# Patient Record
Sex: FEMALE | Race: ASIAN | NOT HISPANIC OR LATINO
[De-identification: names, ages, dates, MRNs, and addresses within clinical notes are randomized per-mention and may not be internally consistent; named-entity substitution may affect disease eponyms.]

---

## 2017-01-26 ENCOUNTER — APPOINTMENT (OUTPATIENT)
Dept: CARDIOLOGY | Facility: CLINIC | Age: 72
End: 2017-01-26

## 2017-01-26 ENCOUNTER — NON-APPOINTMENT (OUTPATIENT)
Age: 72
End: 2017-01-26

## 2017-01-26 VITALS
HEART RATE: 79 BPM | BODY MASS INDEX: 26.74 KG/M2 | RESPIRATION RATE: 17 BRPM | WEIGHT: 149 LBS | TEMPERATURE: 97.6 F | DIASTOLIC BLOOD PRESSURE: 76 MMHG | OXYGEN SATURATION: 96 % | HEIGHT: 62.6 IN | SYSTOLIC BLOOD PRESSURE: 113 MMHG

## 2017-01-26 DIAGNOSIS — R06.00 DYSPNEA, UNSPECIFIED: ICD-10-CM

## 2017-01-26 DIAGNOSIS — R42 DIZZINESS AND GIDDINESS: ICD-10-CM

## 2017-01-26 DIAGNOSIS — R60.0 LOCALIZED EDEMA: ICD-10-CM

## 2017-01-26 DIAGNOSIS — R00.2 PALPITATIONS: ICD-10-CM

## 2017-01-26 PROBLEM — Z00.00 ENCOUNTER FOR PREVENTIVE HEALTH EXAMINATION: Status: ACTIVE | Noted: 2017-01-26

## 2017-01-27 PROBLEM — R60.0 LEG EDEMA: Status: ACTIVE | Noted: 2017-01-27

## 2017-01-27 PROBLEM — R06.00 DYSPNEA: Status: ACTIVE | Noted: 2017-01-27

## 2017-01-27 PROBLEM — R00.2 PALPITATIONS: Status: ACTIVE | Noted: 2017-01-27

## 2017-02-01 ENCOUNTER — APPOINTMENT (OUTPATIENT)
Dept: CARDIOLOGY | Facility: CLINIC | Age: 72
End: 2017-02-01

## 2017-07-03 ENCOUNTER — EMERGENCY (EMERGENCY)
Facility: HOSPITAL | Age: 72
LOS: 1 days | Discharge: HOME | End: 2017-07-03
Attending: EMERGENCY MEDICINE | Admitting: EMERGENCY MEDICINE
Payer: MEDICARE

## 2017-07-03 VITALS
SYSTOLIC BLOOD PRESSURE: 141 MMHG | OXYGEN SATURATION: 95 % | HEART RATE: 90 BPM | TEMPERATURE: 100 F | RESPIRATION RATE: 18 BRPM | DIASTOLIC BLOOD PRESSURE: 81 MMHG

## 2017-07-03 DIAGNOSIS — Z90.710 ACQUIRED ABSENCE OF BOTH CERVIX AND UTERUS: Chronic | ICD-10-CM

## 2017-07-03 DIAGNOSIS — Z96.659 PRESENCE OF UNSPECIFIED ARTIFICIAL KNEE JOINT: Chronic | ICD-10-CM

## 2017-07-03 LAB
ALBUMIN SERPL ELPH-MCNC: 4 G/DL — SIGNIFICANT CHANGE UP (ref 3.3–5)
ALP SERPL-CCNC: 61 U/L — SIGNIFICANT CHANGE UP (ref 40–120)
ALT FLD-CCNC: 9 U/L RC — LOW (ref 10–45)
ANION GAP SERPL CALC-SCNC: 14 MMOL/L — SIGNIFICANT CHANGE UP (ref 5–17)
APPEARANCE UR: CLEAR — SIGNIFICANT CHANGE UP
AST SERPL-CCNC: 19 U/L — SIGNIFICANT CHANGE UP (ref 10–40)
BASOPHILS # BLD AUTO: 0 K/UL — SIGNIFICANT CHANGE UP (ref 0–0.2)
BASOPHILS NFR BLD AUTO: 0.1 % — SIGNIFICANT CHANGE UP (ref 0–2)
BILIRUB SERPL-MCNC: 0.8 MG/DL — SIGNIFICANT CHANGE UP (ref 0.2–1.2)
BILIRUB UR-MCNC: NEGATIVE — SIGNIFICANT CHANGE UP
BUN SERPL-MCNC: 23 MG/DL — SIGNIFICANT CHANGE UP (ref 7–23)
CALCIUM SERPL-MCNC: 9.5 MG/DL — SIGNIFICANT CHANGE UP (ref 8.4–10.5)
CHLORIDE SERPL-SCNC: 99 MMOL/L — SIGNIFICANT CHANGE UP (ref 96–108)
CO2 SERPL-SCNC: 26 MMOL/L — SIGNIFICANT CHANGE UP (ref 22–31)
COLOR SPEC: YELLOW — SIGNIFICANT CHANGE UP
CREAT SERPL-MCNC: 1.18 MG/DL — SIGNIFICANT CHANGE UP (ref 0.5–1.3)
DIFF PNL FLD: ABNORMAL
EOSINOPHIL # BLD AUTO: 0.5 K/UL — SIGNIFICANT CHANGE UP (ref 0–0.5)
EOSINOPHIL NFR BLD AUTO: 4.6 % — SIGNIFICANT CHANGE UP (ref 0–6)
EPI CELLS # UR: SIGNIFICANT CHANGE UP /HPF
GAS PNL BLDV: SIGNIFICANT CHANGE UP
GLUCOSE SERPL-MCNC: 113 MG/DL — HIGH (ref 70–99)
GLUCOSE UR QL: NEGATIVE — SIGNIFICANT CHANGE UP
HCT VFR BLD CALC: 37.9 % — SIGNIFICANT CHANGE UP (ref 34.5–45)
HGB BLD-MCNC: 12.9 G/DL — SIGNIFICANT CHANGE UP (ref 11.5–15.5)
KETONES UR-MCNC: NEGATIVE — SIGNIFICANT CHANGE UP
LEUKOCYTE ESTERASE UR-ACNC: ABNORMAL
LYMPHOCYTES # BLD AUTO: 0.6 K/UL — LOW (ref 1–3.3)
LYMPHOCYTES # BLD AUTO: 5.2 % — LOW (ref 13–44)
MCHC RBC-ENTMCNC: 31.2 PG — SIGNIFICANT CHANGE UP (ref 27–34)
MCHC RBC-ENTMCNC: 34.1 GM/DL — SIGNIFICANT CHANGE UP (ref 32–36)
MCV RBC AUTO: 91.5 FL — SIGNIFICANT CHANGE UP (ref 80–100)
MONOCYTES # BLD AUTO: 0.4 K/UL — SIGNIFICANT CHANGE UP (ref 0–0.9)
MONOCYTES NFR BLD AUTO: 3.6 % — SIGNIFICANT CHANGE UP (ref 2–14)
NEUTROPHILS # BLD AUTO: 10.2 K/UL — HIGH (ref 1.8–7.4)
NEUTROPHILS NFR BLD AUTO: 86.6 % — HIGH (ref 43–77)
NITRITE UR-MCNC: NEGATIVE — SIGNIFICANT CHANGE UP
PH UR: 6.5 — SIGNIFICANT CHANGE UP (ref 5–8)
PLATELET # BLD AUTO: 160 K/UL — SIGNIFICANT CHANGE UP (ref 150–400)
POTASSIUM SERPL-MCNC: 4.4 MMOL/L — SIGNIFICANT CHANGE UP (ref 3.5–5.3)
POTASSIUM SERPL-SCNC: 4.4 MMOL/L — SIGNIFICANT CHANGE UP (ref 3.5–5.3)
PROT SERPL-MCNC: 7 G/DL — SIGNIFICANT CHANGE UP (ref 6–8.3)
PROT UR-MCNC: 300 MG/DL
RBC # BLD: 4.14 M/UL — SIGNIFICANT CHANGE UP (ref 3.8–5.2)
RBC # FLD: 11.6 % — SIGNIFICANT CHANGE UP (ref 10.3–14.5)
RBC CASTS # UR COMP ASSIST: ABNORMAL /HPF (ref 0–2)
SODIUM SERPL-SCNC: 139 MMOL/L — SIGNIFICANT CHANGE UP (ref 135–145)
SP GR SPEC: 1.01 — SIGNIFICANT CHANGE UP (ref 1.01–1.02)
UROBILINOGEN FLD QL: NEGATIVE — SIGNIFICANT CHANGE UP
WBC # BLD: 11.8 K/UL — HIGH (ref 3.8–10.5)
WBC # FLD AUTO: 11.8 K/UL — HIGH (ref 3.8–10.5)
WBC UR QL: SIGNIFICANT CHANGE UP /HPF (ref 0–5)

## 2017-07-03 PROCEDURE — 83605 ASSAY OF LACTIC ACID: CPT

## 2017-07-03 PROCEDURE — 85027 COMPLETE CBC AUTOMATED: CPT

## 2017-07-03 PROCEDURE — 84295 ASSAY OF SERUM SODIUM: CPT

## 2017-07-03 PROCEDURE — 82947 ASSAY GLUCOSE BLOOD QUANT: CPT

## 2017-07-03 PROCEDURE — 71020: CPT | Mod: 26

## 2017-07-03 PROCEDURE — 81001 URINALYSIS AUTO W/SCOPE: CPT

## 2017-07-03 PROCEDURE — 82435 ASSAY OF BLOOD CHLORIDE: CPT

## 2017-07-03 PROCEDURE — 82803 BLOOD GASES ANY COMBINATION: CPT

## 2017-07-03 PROCEDURE — 96376 TX/PRO/DX INJ SAME DRUG ADON: CPT

## 2017-07-03 PROCEDURE — 85014 HEMATOCRIT: CPT

## 2017-07-03 PROCEDURE — 87086 URINE CULTURE/COLONY COUNT: CPT

## 2017-07-03 PROCEDURE — 71046 X-RAY EXAM CHEST 2 VIEWS: CPT

## 2017-07-03 PROCEDURE — 96375 TX/PRO/DX INJ NEW DRUG ADDON: CPT

## 2017-07-03 PROCEDURE — 99220: CPT

## 2017-07-03 PROCEDURE — 99284 EMERGENCY DEPT VISIT MOD MDM: CPT | Mod: 25

## 2017-07-03 PROCEDURE — 96374 THER/PROPH/DIAG INJ IV PUSH: CPT

## 2017-07-03 PROCEDURE — 82330 ASSAY OF CALCIUM: CPT

## 2017-07-03 PROCEDURE — 80053 COMPREHEN METABOLIC PANEL: CPT

## 2017-07-03 PROCEDURE — 84132 ASSAY OF SERUM POTASSIUM: CPT

## 2017-07-03 PROCEDURE — G0378: CPT

## 2017-07-03 PROCEDURE — 87040 BLOOD CULTURE FOR BACTERIA: CPT

## 2017-07-03 RX ORDER — KETOROLAC TROMETHAMINE 30 MG/ML
30 SYRINGE (ML) INJECTION ONCE
Qty: 0 | Refills: 0 | Status: DISCONTINUED | OUTPATIENT
Start: 2017-07-03 | End: 2017-07-03

## 2017-07-03 RX ORDER — SODIUM CHLORIDE 9 MG/ML
1000 INJECTION INTRAMUSCULAR; INTRAVENOUS; SUBCUTANEOUS
Qty: 0 | Refills: 0 | Status: DISCONTINUED | OUTPATIENT
Start: 2017-07-03 | End: 2017-07-07

## 2017-07-03 RX ORDER — KETOROLAC TROMETHAMINE 30 MG/ML
15 SYRINGE (ML) INJECTION ONCE
Qty: 0 | Refills: 0 | Status: DISCONTINUED | OUTPATIENT
Start: 2017-07-03 | End: 2017-07-03

## 2017-07-03 RX ORDER — ACETAMINOPHEN 500 MG
1000 TABLET ORAL ONCE
Qty: 0 | Refills: 0 | Status: COMPLETED | OUTPATIENT
Start: 2017-07-03 | End: 2017-07-03

## 2017-07-03 RX ORDER — SODIUM CHLORIDE 9 MG/ML
1000 INJECTION INTRAMUSCULAR; INTRAVENOUS; SUBCUTANEOUS ONCE
Qty: 0 | Refills: 0 | Status: COMPLETED | OUTPATIENT
Start: 2017-07-03 | End: 2017-07-03

## 2017-07-03 RX ORDER — CEFTRIAXONE 500 MG/1
1 INJECTION, POWDER, FOR SOLUTION INTRAMUSCULAR; INTRAVENOUS ONCE
Qty: 0 | Refills: 0 | Status: COMPLETED | OUTPATIENT
Start: 2017-07-03 | End: 2017-07-03

## 2017-07-03 RX ORDER — CEFTRIAXONE 500 MG/1
1 INJECTION, POWDER, FOR SOLUTION INTRAMUSCULAR; INTRAVENOUS EVERY 12 HOURS
Qty: 0 | Refills: 0 | Status: DISCONTINUED | OUTPATIENT
Start: 2017-07-03 | End: 2017-07-07

## 2017-07-03 RX ADMIN — SODIUM CHLORIDE 2000 MILLILITER(S): 9 INJECTION INTRAMUSCULAR; INTRAVENOUS; SUBCUTANEOUS at 09:25

## 2017-07-03 RX ADMIN — Medication 400 MILLIGRAM(S): at 09:26

## 2017-07-03 RX ADMIN — CEFTRIAXONE 100 GRAM(S): 500 INJECTION, POWDER, FOR SOLUTION INTRAMUSCULAR; INTRAVENOUS at 11:15

## 2017-07-03 RX ADMIN — Medication 15 MILLIGRAM(S): at 19:37

## 2017-07-03 RX ADMIN — SODIUM CHLORIDE 100 MILLILITER(S): 9 INJECTION INTRAMUSCULAR; INTRAVENOUS; SUBCUTANEOUS at 23:31

## 2017-07-03 RX ADMIN — SODIUM CHLORIDE 100 MILLILITER(S): 9 INJECTION INTRAMUSCULAR; INTRAVENOUS; SUBCUTANEOUS at 13:50

## 2017-07-03 RX ADMIN — Medication 15 MILLIGRAM(S): at 20:01

## 2017-07-03 RX ADMIN — CEFTRIAXONE 100 GRAM(S): 500 INJECTION, POWDER, FOR SOLUTION INTRAMUSCULAR; INTRAVENOUS at 23:28

## 2017-07-03 RX ADMIN — SODIUM CHLORIDE 1000 MILLILITER(S): 9 INJECTION INTRAMUSCULAR; INTRAVENOUS; SUBCUTANEOUS at 17:00

## 2017-07-03 NOTE — ED CDU PROVIDER NOTE - PLAN OF CARE
1. Take Cefpodoxime 200mg 1 tab 2x/day for 10 days.  Increase fluids. Take Motrin 600mg every 8 hours with food if needed for pain.   2.  Follow up with your PMD within 48-72 hours.  3. Worsening pain, new fever, chills, nausea, vomiting return to ER

## 2017-07-03 NOTE — ED CDU PROVIDER NOTE - OBJECTIVE STATEMENT
72yof pmhx of Parkinson, here with daughter for fever. Pt reports being treated for a UTI on 6/26 with macrobid in [place] but for past 2 days with R sided back pain, nausea, one episode of NB NB vomiting. Seen by Dr. Jeovanny Chávez today and sent to the ER, concerning for pyelo. NO pain, no cough, NO uri symptoms, no rash 72 year old female with Parkinsons Disease presents with complaint of fever. Here with daughter for fever. Pt reports being treated for a UTI on 6/26 with macrobid in [place] but for past 2 days with R sided back pain, nausea, one episode of NB vomiting. Seen by Dr. Jeovanny Chávez today and sent to the ER, concerning for pyelo. NO pain, no cough, NO uri symptoms, no rash

## 2017-07-03 NOTE — ED CDU PROVIDER NOTE - PROGRESS NOTE DETAILS
Patient resting in bed comfortably. No distress, no complaints. Vital Signs Stable. -Lizandro Gaffney PA-C CDU NOTE MANISH AGUILERA: Pt resting comfortably, but c/o moderate H/A. states pain in R lower back/flank is improved. NAD, VSS. CV: S1S2 RRR, Lungs: CTA B/L, mild ttp of R lumbar paravertebral muscles/R flank. CDU progress note MANISH Waller: Patient sleeping comfortably. NAD. VSS. Pt resting comfortably. NAD. No complaints. VSS. feeling much better. Family at bedside. will d/c home after eval by Dr. Ethan Washington Dr Long attending-- Pt evaluated with translation help from daughter feeling much better no vomiting reduced pain and fever CVA tenderness improved abdomen soft labs evaluated safe d/c home will d/c with oral antibiotics , to follow urine c/s reports -- Ethan

## 2017-07-03 NOTE — ED PROVIDER NOTE - PROGRESS NOTE DETAILS
spoke to pt and family with updated results. Discussed with pt for CDU stay possible overnight for iv abx along with antipyretics. Agree to stay. Plan discussed with MANISH Washington Dr Long attending-- Pt evaluated with translation help from daughter feeling much better no vomiting reduced pain and fever CVA tenderness improved abdomen soft labs evaluated safe d/c home will d/c with oral antibiotics , to follow urine c/s reports -- Ethan

## 2017-07-03 NOTE — ED CDU PROVIDER NOTE - MEDICAL DECISION MAKING DETAILS
Pt with UTI outpatient treatment with macrobid breakthro chills fever right cva tenderness likely pyelonephritis CDU obs for iv antibiotics -- Long

## 2017-07-03 NOTE — ED ADULT NURSE NOTE - OBJECTIVE STATEMENT
0910 72 yr old  female brought to ER by daughter for further eval and tx. Was eval in Dr's office today. diagnosed with pyelonephritis. Hx of UTI/bladder infection 2 wks ago . has one more dose left of Nitrofurantoin. Fever today and c/o HA and r flank pain. A&Ox3. color pink. skin W&D. Lungs clear. abd soft. +R CVA tenderness and discomfort with urination

## 2017-07-03 NOTE — ED ADULT NURSE REASSESSMENT NOTE - NS ED NURSE REASSESS COMMENT FT1
1048 To go to CDU for monitoring. No bed available at present. A&Ox3. Daughter at [facility]. Lunch tray given. R flank pain resolved with rest. 2/10 with palpation
Pt received from KYLIE Killian. Pt oriented to CDU & plan of care was discussed. Pt states she has flank pain on palpatation. Denies any other urinary symptoms. Pt also complains of headache. Medicated as per MAR.  Safety & comfort measures maintained. Call bell in reach. Will continue to monitor.

## 2017-07-03 NOTE — ED PROVIDER NOTE - OBJECTIVE STATEMENT
72yof pmhx of Parkinson, here with daughter for fever. Pt reports being treated for a UTI on 6/26 with macrobid in [place] but for past 2 days with R sided back pain, nausea, one episode of NB NB vomiting. Seen by Dr. Jeovanny Chávez today and sent to the ER, concerning for pyelo. NO pain, no cough, NO uri symx, no rash.

## 2017-07-03 NOTE — ED PROVIDER NOTE - MEDICAL DECISION MAKING DETAILS
r/o pyelo- labs with cx, UA, Urine Culture, IV ABX, IVF, fever control, CDU vs admission -MANISH Washington

## 2017-07-03 NOTE — ED CDU PROVIDER NOTE - ATTENDING CONTRIBUTION TO CARE
I have seen and evaluated this patient with the [facility] practice clinician.   I agree with the findings  unless other wise stated. I have amended notes where needed.  After my face to face bedside evaluation, I am noting:  Pt with UTI outpatient treatment with macrobid breakthro chills fever right cva tenderness likely pyelonephritis CDU obs for iv antibiotics -- Long

## 2017-07-03 NOTE — ED PROVIDER NOTE - ATTENDING CONTRIBUTION TO CARE
I have seen and evaluated this patient with the [facility].   I agree with the findings  unless other wise stated.  I have made appropriate changes in documentations where needed, After my face to face bedside evaluation, I am further  noting: Pt with UTI outpatient treatment with macrobid breakthro chills fever right cva tenderness likely pyelonephritis CDU obs for iv antibiotics -- Long

## 2017-07-04 VITALS
SYSTOLIC BLOOD PRESSURE: 116 MMHG | TEMPERATURE: 98 F | HEART RATE: 79 BPM | RESPIRATION RATE: 17 BRPM | DIASTOLIC BLOOD PRESSURE: 72 MMHG | OXYGEN SATURATION: 95 %

## 2017-07-04 LAB
CULTURE RESULTS: SIGNIFICANT CHANGE UP
SPECIMEN SOURCE: SIGNIFICANT CHANGE UP

## 2017-07-04 PROCEDURE — 99217: CPT

## 2017-07-04 RX ORDER — CEFPODOXIME PROXETIL 100 MG
1 TABLET ORAL
Qty: 20 | Refills: 0
Start: 2017-07-04 | End: 2017-07-14

## 2017-07-08 LAB
CULTURE RESULTS: SIGNIFICANT CHANGE UP
CULTURE RESULTS: SIGNIFICANT CHANGE UP
SPECIMEN SOURCE: SIGNIFICANT CHANGE UP
SPECIMEN SOURCE: SIGNIFICANT CHANGE UP

## 2017-09-27 ENCOUNTER — APPOINTMENT (OUTPATIENT)
Dept: UROLOGY | Facility: CLINIC | Age: 72
End: 2017-09-27
Payer: MEDICARE

## 2017-09-27 VITALS
OXYGEN SATURATION: 99 % | RESPIRATION RATE: 17 BRPM | TEMPERATURE: 98.3 F | BODY MASS INDEX: 26.19 KG/M2 | SYSTOLIC BLOOD PRESSURE: 119 MMHG | HEART RATE: 73 BPM | DIASTOLIC BLOOD PRESSURE: 73 MMHG | WEIGHT: 146 LBS | HEIGHT: 62.6 IN

## 2017-09-27 PROCEDURE — 99203 OFFICE O/P NEW LOW 30 MIN: CPT

## 2017-10-02 LAB
APPEARANCE: CLEAR
BACTERIA UR CULT: ABNORMAL
BACTERIA: NEGATIVE
BILIRUBIN URINE: NEGATIVE
BLOOD URINE: ABNORMAL
COLOR: YELLOW
GLUCOSE QUALITATIVE U: NORMAL MG/DL
HYALINE CASTS: 1 /LPF
KETONES URINE: NEGATIVE
LEUKOCYTE ESTERASE URINE: ABNORMAL
MICROSCOPIC-UA: NORMAL
NITRITE URINE: NEGATIVE
PH URINE: 7.5
PROTEIN URINE: NEGATIVE MG/DL
RED BLOOD CELLS URINE: 23 /HPF
SPECIFIC GRAVITY URINE: 1.01
SQUAMOUS EPITHELIAL CELLS: 1 /HPF
UROBILINOGEN URINE: NORMAL MG/DL
WHITE BLOOD CELLS URINE: 1 /HPF

## 2017-10-25 ENCOUNTER — APPOINTMENT (OUTPATIENT)
Age: 72
End: 2017-10-25
Payer: MEDICARE

## 2017-10-25 VITALS
RESPIRATION RATE: 17 BRPM | SYSTOLIC BLOOD PRESSURE: 128 MMHG | OXYGEN SATURATION: 98 % | BODY MASS INDEX: 26.55 KG/M2 | DIASTOLIC BLOOD PRESSURE: 81 MMHG | TEMPERATURE: 98.3 F | HEART RATE: 77 BPM | WEIGHT: 148 LBS

## 2017-10-25 DIAGNOSIS — N39.0 URINARY TRACT INFECTION, SITE NOT SPECIFIED: ICD-10-CM

## 2017-10-25 PROCEDURE — 99213 OFFICE O/P EST LOW 20 MIN: CPT

## 2017-10-25 RX ORDER — SULFAMETHOXAZOLE AND TRIMETHOPRIM 800; 160 MG/1; MG/1
800-160 TABLET ORAL TWICE DAILY
Qty: 10 | Refills: 0 | Status: COMPLETED | COMMUNITY
Start: 2017-09-30 | End: 2017-10-25

## 2017-10-25 RX ORDER — MIRABEGRON 25 MG/1
25 TABLET, FILM COATED, EXTENDED RELEASE ORAL
Qty: 90 | Refills: 3 | Status: COMPLETED | COMMUNITY
Start: 2017-09-27 | End: 2017-10-25

## 2017-10-30 LAB
APPEARANCE: CLEAR
BACTERIA UR CULT: ABNORMAL
BACTERIA: ABNORMAL
BILIRUBIN URINE: NEGATIVE
BLOOD URINE: ABNORMAL
COLOR: YELLOW
GLUCOSE QUALITATIVE U: NEGATIVE MG/DL
HYALINE CASTS: 0 /LPF
KETONES URINE: NEGATIVE
LEUKOCYTE ESTERASE URINE: ABNORMAL
MICROSCOPIC-UA: NORMAL
NITRITE URINE: NEGATIVE
PH URINE: 6
PROTEIN URINE: ABNORMAL MG/DL
RED BLOOD CELLS URINE: 2 /HPF
SPECIFIC GRAVITY URINE: 1.02
SQUAMOUS EPITHELIAL CELLS: 8 /HPF
UROBILINOGEN URINE: NEGATIVE MG/DL
WHITE BLOOD CELLS URINE: 6 /HPF

## 2018-04-04 ENCOUNTER — APPOINTMENT (OUTPATIENT)
Age: 73
End: 2018-04-04
Payer: MEDICARE

## 2018-04-04 VITALS
OXYGEN SATURATION: 96 % | DIASTOLIC BLOOD PRESSURE: 85 MMHG | HEART RATE: 72 BPM | SYSTOLIC BLOOD PRESSURE: 132 MMHG | RESPIRATION RATE: 18 BRPM | WEIGHT: 149 LBS | BODY MASS INDEX: 26.73 KG/M2

## 2018-04-04 DIAGNOSIS — G20 PARKINSON'S DISEASE: ICD-10-CM

## 2018-04-04 DIAGNOSIS — R39.9 UNSPECIFIED SYMPTOMS AND SIGNS INVOLVING THE GENITOURINARY SYSTEM: ICD-10-CM

## 2018-04-04 DIAGNOSIS — K59.09 OTHER CONSTIPATION: ICD-10-CM

## 2018-04-04 PROCEDURE — 99213 OFFICE O/P EST LOW 20 MIN: CPT

## 2018-04-04 RX ORDER — DOCUSATE SODIUM 100 MG/1
100 CAPSULE ORAL
Qty: 60 | Refills: 3 | Status: ACTIVE | COMMUNITY
Start: 2018-04-04 | End: 1900-01-01

## 2018-04-05 PROBLEM — R39.9 LOWER URINARY TRACT SYMPTOMS (LUTS): Status: ACTIVE | Noted: 2017-09-27

## 2018-04-05 PROBLEM — G20 PARKINSON DISEASE, SYMPTOMATIC: Status: ACTIVE | Noted: 2017-09-29

## 2018-04-05 PROBLEM — K59.09 CHRONIC CONSTIPATION: Status: ACTIVE | Noted: 2017-09-29

## 2018-04-05 RX ORDER — SULFAMETHOXAZOLE AND TRIMETHOPRIM 800; 160 MG/1; MG/1
800-160 TABLET ORAL
Qty: 14 | Refills: 0 | Status: COMPLETED | COMMUNITY
Start: 2017-10-30 | End: 2018-04-05

## 2018-10-03 ENCOUNTER — APPOINTMENT (OUTPATIENT)
Dept: UROLOGY | Facility: CLINIC | Age: 73
End: 2018-10-03

## 2020-12-15 PROBLEM — N39.0 ACUTE UTI: Status: RESOLVED | Noted: 2017-09-30 | Resolved: 2020-12-15

## 2022-03-02 PROBLEM — G20 PARKINSON'S DISEASE: Chronic | Status: ACTIVE | Noted: 2017-07-03

## 2022-03-08 ENCOUNTER — APPOINTMENT (OUTPATIENT)
Dept: SURGERY | Facility: CLINIC | Age: 77
End: 2022-03-08
Payer: MEDICARE

## 2022-03-08 VITALS
SYSTOLIC BLOOD PRESSURE: 116 MMHG | TEMPERATURE: 98.6 F | OXYGEN SATURATION: 95 % | DIASTOLIC BLOOD PRESSURE: 71 MMHG | BODY MASS INDEX: 25.4 KG/M2 | HEIGHT: 62 IN | HEART RATE: 77 BPM | RESPIRATION RATE: 16 BRPM | WEIGHT: 138 LBS

## 2022-03-08 PROCEDURE — 99204 OFFICE O/P NEW MOD 45 MIN: CPT

## 2022-03-08 NOTE — REVIEW OF SYSTEMS
[Negative] : Integumentary [FreeTextEntry7] : Postprandial pain as per HPI [FreeTextEntry8] : LUTS [de-identified] : Parkinson's tremor

## 2022-03-08 NOTE — HISTORY OF PRESENT ILLNESS
[de-identified] : Patient referred by Dr. Darryl Savage\par \par Patient is a 77 year old woman with history of Parkinson's disease, chronic constipation, history of appendectomy and hysterectomy, who presents for 3 month history of postprandial colicky pain, for which she underwent workup including RUQ US which demonstrated cholelithiasis and simple hepatic cysts, as well as EGD on 2/12, which was negative for PUD. Denies N/V, no fever/chills, does endorse constipation. Denies smoking/EtOH/drugs. NKDA.

## 2022-03-08 NOTE — PHYSICAL EXAM
[Respiratory Effort] : normal respiratory effort [Normal Rate and Rhythm] : normal rate and rhythm [Alert] : alert [Oriented to Person] : oriented to person [Oriented to Place] : oriented to place [Oriented to Time] : oriented to time [Calm] : calm [de-identified] : NAD [de-identified] : Soft, non-distended, non-TTP in all quadrants, no rebound/guarding [de-identified] : Tremor at rest

## 2022-03-08 NOTE — PLAN
[FreeTextEntry1] : \par - I spoke at length with the patient and her daughter regarding the findings, which are compatible with biliary colic and therefore offered her robotic-assisted laparoscopic cholecystectomy, possible open\par - We discussed the indications, risks, benefits, and alternatives of the procedure, to which the patient stated she understood, gave consent, and all her questions were answered\par - Patient to follow with Dr. Mora for medical risk stratification\par - Surgical planning\par - Patient advised that should she have unremitting RUQ/epigastric pain a/w fever/chills, N/V, to go to the ED for evaluation\par - Patient was agreeable with this plan and all her questions were answered

## 2022-04-01 ENCOUNTER — OUTPATIENT (OUTPATIENT)
Dept: OUTPATIENT SERVICES | Facility: HOSPITAL | Age: 77
LOS: 1 days | Discharge: HOME | End: 2022-04-01
Payer: MEDICARE

## 2022-04-01 VITALS
DIASTOLIC BLOOD PRESSURE: 74 MMHG | WEIGHT: 138.01 LBS | TEMPERATURE: 98 F | HEIGHT: 63 IN | OXYGEN SATURATION: 96 % | HEART RATE: 87 BPM | RESPIRATION RATE: 18 BRPM | SYSTOLIC BLOOD PRESSURE: 115 MMHG

## 2022-04-01 DIAGNOSIS — Z96.659 PRESENCE OF UNSPECIFIED ARTIFICIAL KNEE JOINT: Chronic | ICD-10-CM

## 2022-04-01 DIAGNOSIS — K80.20 CALCULUS OF GALLBLADDER WITHOUT CHOLECYSTITIS WITHOUT OBSTRUCTION: ICD-10-CM

## 2022-04-01 DIAGNOSIS — Z01.818 ENCOUNTER FOR OTHER PREPROCEDURAL EXAMINATION: ICD-10-CM

## 2022-04-01 DIAGNOSIS — G20 PARKINSON'S DISEASE: ICD-10-CM

## 2022-04-01 DIAGNOSIS — E78.5 HYPERLIPIDEMIA, UNSPECIFIED: ICD-10-CM

## 2022-04-01 DIAGNOSIS — Z90.710 ACQUIRED ABSENCE OF BOTH CERVIX AND UTERUS: Chronic | ICD-10-CM

## 2022-04-01 DIAGNOSIS — Z98.890 OTHER SPECIFIED POSTPROCEDURAL STATES: Chronic | ICD-10-CM

## 2022-04-01 DIAGNOSIS — K80.50 CALCULUS OF BILE DUCT WITHOUT CHOLANGITIS OR CHOLECYSTITIS WITHOUT OBSTRUCTION: ICD-10-CM

## 2022-04-01 LAB
ALBUMIN SERPL ELPH-MCNC: 3.9 G/DL — SIGNIFICANT CHANGE UP (ref 3.3–5)
ALP SERPL-CCNC: 67 U/L — SIGNIFICANT CHANGE UP (ref 40–120)
ALT FLD-CCNC: 7 U/L — LOW (ref 12–78)
ANION GAP SERPL CALC-SCNC: 5 MMOL/L — SIGNIFICANT CHANGE UP (ref 5–17)
AST SERPL-CCNC: 15 U/L — SIGNIFICANT CHANGE UP (ref 15–37)
BASOPHILS # BLD AUTO: 0.06 K/UL — SIGNIFICANT CHANGE UP (ref 0–0.2)
BASOPHILS NFR BLD AUTO: 0.8 % — SIGNIFICANT CHANGE UP (ref 0–2)
BILIRUB SERPL-MCNC: 0.8 MG/DL — SIGNIFICANT CHANGE UP (ref 0.2–1.2)
BUN SERPL-MCNC: 22 MG/DL — SIGNIFICANT CHANGE UP (ref 7–23)
CALCIUM SERPL-MCNC: 9.5 MG/DL — SIGNIFICANT CHANGE UP (ref 8.5–10.1)
CHLORIDE SERPL-SCNC: 110 MMOL/L — HIGH (ref 96–108)
CO2 SERPL-SCNC: 27 MMOL/L — SIGNIFICANT CHANGE UP (ref 22–31)
CREAT SERPL-MCNC: 0.68 MG/DL — SIGNIFICANT CHANGE UP (ref 0.5–1.3)
EGFR: 89 ML/MIN/1.73M2 — SIGNIFICANT CHANGE UP
EOSINOPHIL # BLD AUTO: 0.15 K/UL — SIGNIFICANT CHANGE UP (ref 0–0.5)
EOSINOPHIL NFR BLD AUTO: 1.9 % — SIGNIFICANT CHANGE UP (ref 0–6)
GLUCOSE SERPL-MCNC: 92 MG/DL — SIGNIFICANT CHANGE UP (ref 70–99)
HCT VFR BLD CALC: 36.9 % — SIGNIFICANT CHANGE UP (ref 34.5–45)
HGB BLD-MCNC: 12.1 G/DL — SIGNIFICANT CHANGE UP (ref 11.5–15.5)
IMM GRANULOCYTES NFR BLD AUTO: 0.3 % — SIGNIFICANT CHANGE UP (ref 0–1.5)
LYMPHOCYTES # BLD AUTO: 1.24 K/UL — SIGNIFICANT CHANGE UP (ref 1–3.3)
LYMPHOCYTES # BLD AUTO: 15.5 % — SIGNIFICANT CHANGE UP (ref 13–44)
MCHC RBC-ENTMCNC: 29.7 PG — SIGNIFICANT CHANGE UP (ref 27–34)
MCHC RBC-ENTMCNC: 32.8 G/DL — SIGNIFICANT CHANGE UP (ref 32–36)
MCV RBC AUTO: 90.4 FL — SIGNIFICANT CHANGE UP (ref 80–100)
MONOCYTES # BLD AUTO: 0.46 K/UL — SIGNIFICANT CHANGE UP (ref 0–0.9)
MONOCYTES NFR BLD AUTO: 5.8 % — SIGNIFICANT CHANGE UP (ref 2–14)
NEUTROPHILS # BLD AUTO: 6.05 K/UL — SIGNIFICANT CHANGE UP (ref 1.8–7.4)
NEUTROPHILS NFR BLD AUTO: 75.7 % — SIGNIFICANT CHANGE UP (ref 43–77)
NRBC # BLD: 0 /100 WBCS — SIGNIFICANT CHANGE UP (ref 0–0)
PLATELET # BLD AUTO: 190 K/UL — SIGNIFICANT CHANGE UP (ref 150–400)
POTASSIUM SERPL-MCNC: 4.2 MMOL/L — SIGNIFICANT CHANGE UP (ref 3.5–5.3)
POTASSIUM SERPL-SCNC: 4.2 MMOL/L — SIGNIFICANT CHANGE UP (ref 3.5–5.3)
PROT SERPL-MCNC: 7.6 GM/DL — SIGNIFICANT CHANGE UP (ref 6–8.3)
RBC # BLD: 4.08 M/UL — SIGNIFICANT CHANGE UP (ref 3.8–5.2)
RBC # FLD: 13.3 % — SIGNIFICANT CHANGE UP (ref 10.3–14.5)
SODIUM SERPL-SCNC: 142 MMOL/L — SIGNIFICANT CHANGE UP (ref 135–145)
WBC # BLD: 7.98 K/UL — SIGNIFICANT CHANGE UP (ref 3.8–10.5)
WBC # FLD AUTO: 7.98 K/UL — SIGNIFICANT CHANGE UP (ref 3.8–10.5)

## 2022-04-01 PROCEDURE — 93010 ELECTROCARDIOGRAM REPORT: CPT

## 2022-04-01 RX ORDER — DEXLANSOPRAZOLE 30 MG/1
0 CAPSULE, DELAYED RELEASE ORAL
Qty: 0 | Refills: 0 | DISCHARGE

## 2022-04-01 RX ORDER — MELOXICAM 15 MG/1
0 TABLET ORAL
Qty: 0 | Refills: 0 | DISCHARGE

## 2022-04-01 RX ORDER — LIPASE/PROTEASE/AMYLASE 16-48-48K
0 CAPSULE,DELAYED RELEASE (ENTERIC COATED) ORAL
Qty: 0 | Refills: 0 | DISCHARGE

## 2022-04-01 NOTE — H&P PST ADULT - NSICDXPASTSURGICALHX_GEN_ALL_CORE_FT
PAST SURGICAL HISTORY:  H/O total knee replacement     H/O: hysterectomy      PAST SURGICAL HISTORY:  H/O total knee replacement     H/O: hysterectomy     History of bladder surgery

## 2022-04-01 NOTE — H&P PST ADULT - NSICDXPASTMEDICALHX_GEN_ALL_CORE_FT
PAST MEDICAL HISTORY:  Parkinsons      PAST MEDICAL HISTORY:  HLD (hyperlipidemia)     Parkinsons

## 2022-04-01 NOTE — H&P PST ADULT - PROBLEM SELECTOR PLAN 4
Preop instructions provided including NPO status. Hibiclens wash for infection control. Patient aware to stop NSAID, OTC herbals  for 7-10 days, needs to be accompanied  by adult upon discharge.  Patient verbalized understanding  patient instructed on having covid19 swab 3 days prior to surgery  medical clearance  anesthesiologist to review pst labs, ekg, medical clearances and optimization for surgery

## 2022-04-01 NOTE — H&P PST ADULT - ATTENDING COMMENTS
I spoke with the patient using Pashto  Tawnya ([id number]) and we discussed the plans to perform a robotic-assisted laparoscopic cholecystectomy, possible open, and we discussed the indications, risks, benefits, and alternatives, to which the patient stated she understood, gave consent, and all her questions were answered.

## 2022-04-01 NOTE — H&P PST ADULT - NEGATIVE RESPIRATORY AND THORAX SYMPTOMS
[FreeTextEntry1] : ECG:  Normal sinus rhythm at 56 .  Left atrial enlargement , PRWP without any significant ST-T wave changes.  \par \par Echocardiogram 19:\par Overall normal LV size and function. Ejection fraction 60-65%\par VSD is not seen on this study.\par Mild mitral and tricuspid insufficiency.\par No evidence of pulmonary hypertension.\par \par Lab Data 20\par Chol: 185\par HDL: 80\par LDL: 90\par Tr\par \par Recent laboratory data obtained on 11/3/18 was remarkable for cholesterol 221. \par HDL 70.\par .\par Electrolytes and LFTs normal. \par Hemoglobin 13.3. \par \par Impression:\par 1.  The patient is a 66 -year-old with a history of membranous VSD, palpitations, elevated cholesterol who presents for cardiac re-evaluation and resolution of x 1 episode of chest tightness in 2019. It was thought to be related to an esophageal spasm stemming from her reflux disease.\par \par 2. Echo revealed  aortic plaque formation. Therefore  started Atorvastatin 10 mg once daily and  repeat blood work work showed appropriate reduction of cholesterol\par \par 3. At this time no cardiac testing needs to be performed and she can follow up in one year.\par \par 4. At this time her palpations have been infrequent and  controlled with out the use of beta blockers.\par She is to contact my office  she is experiencing SOB  chest pain \par \par 5. Consider a repeat echo at f/u\par  no wheezing/no dyspnea/no cough

## 2022-04-01 NOTE — H&P PST ADULT - ASSESSMENT
cholelithiasis cholelithiasis  CAPRINI SCORE    AGE RELATED RISK FACTORS                                                       MOBILITY RELATED FACTORS  [ ] Age 41-60 years                                            (1 Point)                  [ ] Bed rest                                                        (1 Point)  [ ] Age: 61-74 years                                           (2 Points)                [ ] Plaster cast                                                   (2 Points)  [x ] Age= 75 years                                              (3 Points)                 [ ] Bed bound for more than 72 hours                   (2 Points)    DISEASE RELATED RISK FACTORS                                               GENDER SPECIFIC FACTORS  [ ] Edema in the lower extremities                       (1 Point)                  [ ] Pregnancy                                                     (1 Point)  [ ] Varicose veins                                               (1 Point)                  [ ] Post-partum < 6 weeks                                   (1 Point)             x[ ] BMI > 25 Kg/m2                                            (1 Point)                  [ ] Hormonal therapy  or oral contraception            (1 Point)                 [ ] Sepsis (in the previous month)                        (1 Point)                  [ ] History of pregnancy complications  [ ] Pneumonia or serious lung disease                                               [ ] Unexplained or recurrent                       (1 Point)           (in the previous month)                               (1 Point)  [ ] Abnormal pulmonary function test                     (1 Point)                 SURGERY RELATED RISK FACTORS  [ ] Acute myocardial infarction                              (1 Point)                 [ ]  Section                                            (1 Point)  [ ] Congestive heart failure (in the previous month)  (1 Point)                 [ ] Minor surgery                                                 (1 Point)   [ ] Inflammatory bowel disease                             (1 Point)                 [ ] Arthroscopic surgery                                        (2 Points)  [ ] Central venous access                                    (2 Points)                [x ] General surgery lasting more than 45 minutes   (2 Points)       [ ] Stroke (in the previous month)                          (5 Points)               [ ] Elective arthroplasty                                        (5 Points)                                                                                                                                               HEMATOLOGY RELATED FACTORS                                                 TRAUMA RELATED RISK FACTORS  [ ] Prior episodes of VTE                                     (3 Points)                 [ ] Fracture of the hip, pelvis, or leg                       (5 Points)  [ ] Positive family history for VTE                         (3 Points)                 [ ] Acute spinal cord injury (in the previous month)  (5 Points)  [ ] Prothrombin 41513 A                                      (3 Points)                 [ ] Paralysis  (less than 1 month)                          (5 Points)  [ ] Factor V Leiden                                             (3 Points)                 [ ] Multiple Trauma within 1 month                         (5 Points)  [ ] Lupus anticoagulants                                     (3 Points)                                                           [ ] Anticardiolipin antibodies                                (3 Points)                                                       [ ] High homocysteine in the blood                      (3 Points)                                             [ ] Other congenital or acquired thrombophilia       (3 Points)                                                [ ] Heparin induced thrombocytopenia                  (3 Points)                                          Total Score [    6      ]  Caprini Score 0-2: Low risk, No VTE Prophylaxis required for most patient's, encourage ambulation  Caprini Score 3-6: At Risk, Pharmacologic VTE prophylaxis is indicated for most patients ( in the absence of a contraindication)  Caprini Score Greater than or = 7: High Risk , pharmacologic VTE is indicated for most patients ( in the absence of a contraindication)    Caprini score indicates that the patient is high risk for VTE event ( score 6 or greater). Surgical patient's in this group will benefit from both pharmacologic prophylaxis and intermittent compression devices . Surgical team will determine the balance between VTE  risk and bleeding risk and other clinical considerations

## 2022-04-01 NOTE — H&P PST ADULT - NEGATIVE CARDIOVASCULAR SYMPTOMS
Sushil Noland is a 68 year old year old patient who comes in today for a Blood Pressure check because of new medication. Lisinopril started per 06-02-21 OV.  BP Readings from Last 6 Encounters:   07/23/21 126/80   06/02/21 (!) 145/80   03/11/21 (!) 158/79   12/16/20 130/70   09/10/20 125/69   06/01/20 131/64     HR 60  Vital Signs as repeated by RN N/A  Patient is taking medication as prescribed  Patient is tolerating medications well.  Patient is not monitoring Blood Pressure at home.  Average readings if yes are N/A  Current complaints: none  Disposition:  patient to continue with the same medication. Await lab results.  Forwarded to Dr. Mills for review.  JUAN Cabrera RN    
no chest pain/no palpitations/no dyspnea on exertion

## 2022-04-01 NOTE — H&P PST ADULT - MUSCULOSKELETAL
Down to stress test via stretcher. CMU made aware. negative No joint pain, swelling or deformity; no limitation of movement

## 2022-04-01 NOTE — H&P PST ADULT - SOURCE OF INFORMATION, PROFILE
normal... Well appearing, awake, alert, oriented to person, place, time/situation and in no apparent distress. daughter/patient/family

## 2022-04-01 NOTE — H&P PST ADULT - NSANTHOSAYNRD_GEN_A_CORE
No. DEE screening performed.  STOP BANG Legend: 0-2 = LOW Risk; 3-4 = INTERMEDIATE Risk; 5-8 = HIGH Risk

## 2022-04-07 ENCOUNTER — TRANSCRIPTION ENCOUNTER (OUTPATIENT)
Age: 77
End: 2022-04-07

## 2022-04-08 ENCOUNTER — OUTPATIENT (OUTPATIENT)
Dept: OUTPATIENT SERVICES | Facility: HOSPITAL | Age: 77
LOS: 1 days | Discharge: HOME | End: 2022-04-08
Payer: MEDICARE

## 2022-04-08 ENCOUNTER — TRANSCRIPTION ENCOUNTER (OUTPATIENT)
Age: 77
End: 2022-04-08

## 2022-04-08 ENCOUNTER — APPOINTMENT (OUTPATIENT)
Dept: SURGERY | Facility: HOSPITAL | Age: 77
End: 2022-04-08

## 2022-04-08 ENCOUNTER — RESULT REVIEW (OUTPATIENT)
Age: 77
End: 2022-04-08

## 2022-04-08 VITALS
TEMPERATURE: 98 F | OXYGEN SATURATION: 98 % | RESPIRATION RATE: 18 BRPM | DIASTOLIC BLOOD PRESSURE: 82 MMHG | SYSTOLIC BLOOD PRESSURE: 132 MMHG | HEART RATE: 87 BPM

## 2022-04-08 VITALS
TEMPERATURE: 98 F | SYSTOLIC BLOOD PRESSURE: 106 MMHG | OXYGEN SATURATION: 96 % | HEART RATE: 87 BPM | DIASTOLIC BLOOD PRESSURE: 70 MMHG | RESPIRATION RATE: 17 BRPM

## 2022-04-08 DIAGNOSIS — Z96.659 PRESENCE OF UNSPECIFIED ARTIFICIAL KNEE JOINT: Chronic | ICD-10-CM

## 2022-04-08 DIAGNOSIS — Z90.710 ACQUIRED ABSENCE OF BOTH CERVIX AND UTERUS: Chronic | ICD-10-CM

## 2022-04-08 DIAGNOSIS — Z98.890 OTHER SPECIFIED POSTPROCEDURAL STATES: Chronic | ICD-10-CM

## 2022-04-08 PROCEDURE — 88304 TISSUE EXAM BY PATHOLOGIST: CPT | Mod: 26

## 2022-04-08 PROCEDURE — 47562 LAPAROSCOPIC CHOLECYSTECTOMY: CPT | Mod: AS

## 2022-04-08 PROCEDURE — 47562 LAPAROSCOPIC CHOLECYSTECTOMY: CPT

## 2022-04-08 PROCEDURE — S2900 ROBOTIC SURGICAL SYSTEM: CPT | Mod: NC

## 2022-04-08 RX ORDER — HYDROMORPHONE HYDROCHLORIDE 2 MG/ML
0.5 INJECTION INTRAMUSCULAR; INTRAVENOUS; SUBCUTANEOUS
Refills: 0 | Status: DISCONTINUED | OUTPATIENT
Start: 2022-04-08 | End: 2022-04-08

## 2022-04-08 RX ORDER — OXYCODONE HYDROCHLORIDE 5 MG/1
1 TABLET ORAL
Qty: 15 | Refills: 0
Start: 2022-04-08

## 2022-04-08 RX ORDER — ICOSAPENT ETHYL 500 MG/1
0 CAPSULE, LIQUID FILLED ORAL
Qty: 0 | Refills: 0 | DISCHARGE

## 2022-04-08 RX ORDER — HYDROMORPHONE HYDROCHLORIDE 2 MG/ML
1 INJECTION INTRAMUSCULAR; INTRAVENOUS; SUBCUTANEOUS
Refills: 0 | Status: DISCONTINUED | OUTPATIENT
Start: 2022-04-08 | End: 2022-04-08

## 2022-04-08 RX ORDER — METOCLOPRAMIDE HCL 10 MG
10 TABLET ORAL ONCE
Refills: 0 | Status: DISCONTINUED | OUTPATIENT
Start: 2022-04-08 | End: 2022-04-08

## 2022-04-08 RX ORDER — SODIUM CHLORIDE 9 MG/ML
3 INJECTION INTRAMUSCULAR; INTRAVENOUS; SUBCUTANEOUS EVERY 8 HOURS
Refills: 0 | Status: DISCONTINUED | OUTPATIENT
Start: 2022-04-08 | End: 2022-04-08

## 2022-04-08 RX ORDER — SODIUM CHLORIDE 9 MG/ML
1000 INJECTION, SOLUTION INTRAVENOUS
Refills: 0 | Status: DISCONTINUED | OUTPATIENT
Start: 2022-04-08 | End: 2022-04-08

## 2022-04-08 RX ADMIN — SODIUM CHLORIDE 75 MILLILITER(S): 9 INJECTION, SOLUTION INTRAVENOUS at 11:25

## 2022-04-08 RX ADMIN — SODIUM CHLORIDE 3 MILLILITER(S): 9 INJECTION INTRAMUSCULAR; INTRAVENOUS; SUBCUTANEOUS at 09:30

## 2022-04-08 NOTE — ASU DISCHARGE PLAN (ADULT/PEDIATRIC) - CARE PROVIDER_API CALL
Guillermo Hercules (MD)  Surgery  [address], [address] [place] [address]  Phone: [phone]  Fax: [phone]  Follow Up Time: 2 weeks

## 2022-04-08 NOTE — ASU DISCHARGE PLAN (ADULT/PEDIATRIC) - ASU DC SPECIAL INSTRUCTIONSFT
Follow up with Dr. Hercules in 1-2 weeks. Please call to schedule an appointment.   NOTIFY YOUR SURGEON IF: You have any bleeding that does not stop, any pus draining from your wound, any fever (over 100.4 F) or chills, persistent nausea/vomiting, persistent diarrhea, or if your pain is not controlled on your discharge pain medications.    Wound: You may take off outer pink dressing and shower after 48 hours. After showering, pat steri strips dry. Leave the white steri strips in place, they will fall off on their own in approximately 5-7 days or they will be removed at your follow up appointment.

## 2022-04-08 NOTE — ASU PATIENT PROFILE, ADULT - NSICDXPASTSURGICALHX_GEN_ALL_CORE_FT
PAST SURGICAL HISTORY:  H/O total knee replacement     H/O: hysterectomy     History of bladder surgery

## 2022-04-08 NOTE — ASU PATIENT PROFILE, ADULT - FALL HARM RISK - RISK INTERVENTIONS
Assistance OOB with selected safe patient handling equipment/Assistance with ambulation/Communicate Fall Risk and Risk Factors to all staff, patient, and family/Discuss with provider need for PT consult/Monitor gait and stability/Reinforce activity limits and safety measures with patient and family/Visual Cue: Yellow wristband/Bed in lowest position, wheels locked, appropriate side rails in place/Call bell, personal items and telephone in reach/Instruct patient to call for assistance before getting out of bed or chair/Non-slip footwear when patient is out of bed/[place] to call system/Physically safe environment - no spills, clutter or unnecessary equipment/Purposeful Proactive Rounding/Room/bathroom lighting operational, light cord in reach

## 2022-04-08 NOTE — ASU DISCHARGE PLAN (ADULT/PEDIATRIC) - NS MD DC FALL RISK RISK
For information on Fall & Injury Prevention, visit: [email or web address] OR  [email or web address] OR  https://www.cdc.gov/steadi/patient.html

## 2022-04-08 NOTE — ASU PREOP CHECKLIST - TO WHOM
----- Message from Carlee Chong sent at 4/12/2020 11:57 AM CDT -----  Regarding: cpap supply order  Good morning,   Our patient is requesting cpap supplies and the last order we have is from Pete Nelson PA-C. Could your office enter a new cpap supply order signed by the doctor for all replacement supplies?     Thank you,   Carlee Chong   [facility] Customer Service   [facility]      ssaji

## 2022-04-11 PROBLEM — E78.5 HYPERLIPIDEMIA, UNSPECIFIED: Chronic | Status: ACTIVE | Noted: 2022-04-01

## 2022-04-15 ENCOUNTER — APPOINTMENT (OUTPATIENT)
Dept: SURGERY | Facility: CLINIC | Age: 77
End: 2022-04-15
Payer: MEDICARE

## 2022-04-15 VITALS
HEART RATE: 81 BPM | RESPIRATION RATE: 18 BRPM | OXYGEN SATURATION: 97 % | SYSTOLIC BLOOD PRESSURE: 101 MMHG | WEIGHT: 137.2 LBS | BODY MASS INDEX: 25.09 KG/M2 | DIASTOLIC BLOOD PRESSURE: 66 MMHG | TEMPERATURE: 98.3 F

## 2022-04-15 DIAGNOSIS — Z96.659 PRESENCE OF UNSPECIFIED ARTIFICIAL KNEE JOINT: ICD-10-CM

## 2022-04-15 DIAGNOSIS — Z79.82 LONG TERM (CURRENT) USE OF ASPIRIN: ICD-10-CM

## 2022-04-15 DIAGNOSIS — K80.10 CALCULUS OF GALLBLADDER WITH CHRONIC CHOLECYSTITIS WITHOUT OBSTRUCTION: ICD-10-CM

## 2022-04-15 DIAGNOSIS — G20 PARKINSON'S DISEASE: ICD-10-CM

## 2022-04-15 DIAGNOSIS — E78.5 HYPERLIPIDEMIA, UNSPECIFIED: ICD-10-CM

## 2022-04-15 DIAGNOSIS — Z90.710 ACQUIRED ABSENCE OF BOTH CERVIX AND UTERUS: ICD-10-CM

## 2022-04-15 PROCEDURE — 99024 POSTOP FOLLOW-UP VISIT: CPT

## 2022-04-15 NOTE — HISTORY OF PRESENT ILLNESS
[de-identified] : Azeri translation via Morgan Martinez\par \par Patient is a 77 year old woman who underwent robotic-assisted laparoscopic cholecystectomy on 4/8/2022 who presents for postoperative follow up. As per pateint, she still has some discomfort at the incision sites, particularly the right lateral one, but notes that the pain has improved. She is tolerating diet well, does note having constipation, however this has been present prior to her surgery. She also c/o sore throat which she is self treating with warm drinks and lozenges.\par \par Pathology reveals cholelithiasis, chronic cholecystitis, and one benign lymph node, which was reviewed with the patient.\par \par Examination reveals well-healing incisions without incisional hernia, skin is C/D/I, no rebound/guarding, non-TTP in all quadrants\par \par Patient is healing well, advised to avoid heavy lifting for total of 3 weeks, and to follow up with her PCP if the sore throat does not resolve in 1 week. Patient was agreeable with this plan and all her questions were answered

## 2022-07-16 ENCOUNTER — EMERGENCY (EMERGENCY)
Facility: HOSPITAL | Age: 77
LOS: 1 days | Discharge: HOME | End: 2022-07-16
Attending: EMERGENCY MEDICINE
Payer: MEDICARE

## 2022-07-16 VITALS
RESPIRATION RATE: 18 BRPM | OXYGEN SATURATION: 99 % | SYSTOLIC BLOOD PRESSURE: 116 MMHG | HEART RATE: 68 BPM | DIASTOLIC BLOOD PRESSURE: 67 MMHG

## 2022-07-16 VITALS
DIASTOLIC BLOOD PRESSURE: 79 MMHG | OXYGEN SATURATION: 98 % | RESPIRATION RATE: 16 BRPM | WEIGHT: 130.07 LBS | HEIGHT: 63 IN | TEMPERATURE: 98 F | SYSTOLIC BLOOD PRESSURE: 124 MMHG | HEART RATE: 74 BPM

## 2022-07-16 DIAGNOSIS — Z90.710 ACQUIRED ABSENCE OF BOTH CERVIX AND UTERUS: Chronic | ICD-10-CM

## 2022-07-16 DIAGNOSIS — Z98.890 OTHER SPECIFIED POSTPROCEDURAL STATES: Chronic | ICD-10-CM

## 2022-07-16 DIAGNOSIS — Z96.659 PRESENCE OF UNSPECIFIED ARTIFICIAL KNEE JOINT: Chronic | ICD-10-CM

## 2022-07-16 LAB
ALBUMIN SERPL ELPH-MCNC: 4.6 G/DL — SIGNIFICANT CHANGE UP (ref 3.3–5)
ALP SERPL-CCNC: 64 U/L — SIGNIFICANT CHANGE UP (ref 40–120)
ALT FLD-CCNC: 6 U/L — LOW (ref 10–45)
ANION GAP SERPL CALC-SCNC: 11 MMOL/L — SIGNIFICANT CHANGE UP (ref 5–17)
APPEARANCE UR: CLEAR — SIGNIFICANT CHANGE UP
AST SERPL-CCNC: 22 U/L — SIGNIFICANT CHANGE UP (ref 10–40)
BACTERIA # UR AUTO: NEGATIVE — SIGNIFICANT CHANGE UP
BASE EXCESS BLDV CALC-SCNC: 2.7 MMOL/L — HIGH (ref -2–2)
BASOPHILS # BLD AUTO: 0.06 K/UL — SIGNIFICANT CHANGE UP (ref 0–0.2)
BASOPHILS NFR BLD AUTO: 1.3 % — SIGNIFICANT CHANGE UP (ref 0–2)
BILIRUB SERPL-MCNC: 1.1 MG/DL — SIGNIFICANT CHANGE UP (ref 0.2–1.2)
BILIRUB UR-MCNC: NEGATIVE — SIGNIFICANT CHANGE UP
BUN SERPL-MCNC: 12 MG/DL — SIGNIFICANT CHANGE UP (ref 7–23)
CA-I SERPL-SCNC: 1.21 MMOL/L — SIGNIFICANT CHANGE UP (ref 1.15–1.33)
CALCIUM SERPL-MCNC: 9.5 MG/DL — SIGNIFICANT CHANGE UP (ref 8.4–10.5)
CHLORIDE BLDV-SCNC: 104 MMOL/L — SIGNIFICANT CHANGE UP (ref 96–108)
CHLORIDE SERPL-SCNC: 106 MMOL/L — SIGNIFICANT CHANGE UP (ref 96–108)
CO2 BLDV-SCNC: 30 MMOL/L — HIGH (ref 22–26)
CO2 SERPL-SCNC: 27 MMOL/L — SIGNIFICANT CHANGE UP (ref 22–31)
COLOR SPEC: SIGNIFICANT CHANGE UP
CREAT SERPL-MCNC: 0.58 MG/DL — SIGNIFICANT CHANGE UP (ref 0.5–1.3)
DIFF PNL FLD: ABNORMAL
EGFR: 93 ML/MIN/1.73M2 — SIGNIFICANT CHANGE UP
EOSINOPHIL # BLD AUTO: 0.11 K/UL — SIGNIFICANT CHANGE UP (ref 0–0.5)
EOSINOPHIL NFR BLD AUTO: 2.4 % — SIGNIFICANT CHANGE UP (ref 0–6)
EPI CELLS # UR: 1 /HPF — SIGNIFICANT CHANGE UP
GAS PNL BLDV: 140 MMOL/L — SIGNIFICANT CHANGE UP (ref 136–145)
GAS PNL BLDV: SIGNIFICANT CHANGE UP
GAS PNL BLDV: SIGNIFICANT CHANGE UP
GLUCOSE BLDV-MCNC: 96 MG/DL — SIGNIFICANT CHANGE UP (ref 70–99)
GLUCOSE SERPL-MCNC: 101 MG/DL — HIGH (ref 70–99)
GLUCOSE UR QL: NEGATIVE — SIGNIFICANT CHANGE UP
HCO3 BLDV-SCNC: 29 MMOL/L — SIGNIFICANT CHANGE UP (ref 22–29)
HCT VFR BLD CALC: 37.1 % — SIGNIFICANT CHANGE UP (ref 34.5–45)
HCT VFR BLDA CALC: 38 % — SIGNIFICANT CHANGE UP (ref 34.5–46.5)
HGB BLD CALC-MCNC: 12.5 G/DL — SIGNIFICANT CHANGE UP (ref 11.7–16.1)
HGB BLD-MCNC: 11.8 G/DL — SIGNIFICANT CHANGE UP (ref 11.5–15.5)
HYALINE CASTS # UR AUTO: 0 /LPF — SIGNIFICANT CHANGE UP (ref 0–2)
IMM GRANULOCYTES NFR BLD AUTO: 0.2 % — SIGNIFICANT CHANGE UP (ref 0–1.5)
KETONES UR-MCNC: NEGATIVE — SIGNIFICANT CHANGE UP
LACTATE BLDV-MCNC: 1.7 MMOL/L — SIGNIFICANT CHANGE UP (ref 0.7–2)
LEUKOCYTE ESTERASE UR-ACNC: ABNORMAL
LIDOCAIN IGE QN: 23 U/L — SIGNIFICANT CHANGE UP (ref 7–60)
LYMPHOCYTES # BLD AUTO: 0.9 K/UL — LOW (ref 1–3.3)
LYMPHOCYTES # BLD AUTO: 19.4 % — SIGNIFICANT CHANGE UP (ref 13–44)
MCHC RBC-ENTMCNC: 29.1 PG — SIGNIFICANT CHANGE UP (ref 27–34)
MCHC RBC-ENTMCNC: 31.8 GM/DL — LOW (ref 32–36)
MCV RBC AUTO: 91.6 FL — SIGNIFICANT CHANGE UP (ref 80–100)
MONOCYTES # BLD AUTO: 0.34 K/UL — SIGNIFICANT CHANGE UP (ref 0–0.9)
MONOCYTES NFR BLD AUTO: 7.3 % — SIGNIFICANT CHANGE UP (ref 2–14)
NEUTROPHILS # BLD AUTO: 3.22 K/UL — SIGNIFICANT CHANGE UP (ref 1.8–7.4)
NEUTROPHILS NFR BLD AUTO: 69.4 % — SIGNIFICANT CHANGE UP (ref 43–77)
NITRITE UR-MCNC: NEGATIVE — SIGNIFICANT CHANGE UP
NRBC # BLD: 0 /100 WBCS — SIGNIFICANT CHANGE UP (ref 0–0)
PCO2 BLDV: 50 MMHG — HIGH (ref 39–42)
PH BLDV: 7.37 — SIGNIFICANT CHANGE UP (ref 7.32–7.43)
PH UR: 6.5 — SIGNIFICANT CHANGE UP (ref 5–8)
PLATELET # BLD AUTO: 211 K/UL — SIGNIFICANT CHANGE UP (ref 150–400)
PO2 BLDV: 30 MMHG — SIGNIFICANT CHANGE UP (ref 25–45)
POTASSIUM BLDV-SCNC: 3.9 MMOL/L — SIGNIFICANT CHANGE UP (ref 3.5–5.1)
POTASSIUM SERPL-MCNC: 4.4 MMOL/L — SIGNIFICANT CHANGE UP (ref 3.5–5.3)
POTASSIUM SERPL-SCNC: 4.4 MMOL/L — SIGNIFICANT CHANGE UP (ref 3.5–5.3)
PROT SERPL-MCNC: 7.1 G/DL — SIGNIFICANT CHANGE UP (ref 6–8.3)
PROT UR-MCNC: NEGATIVE — SIGNIFICANT CHANGE UP
RBC # BLD: 4.05 M/UL — SIGNIFICANT CHANGE UP (ref 3.8–5.2)
RBC # FLD: 13.2 % — SIGNIFICANT CHANGE UP (ref 10.3–14.5)
RBC CASTS # UR COMP ASSIST: 8 /HPF — HIGH (ref 0–4)
SAO2 % BLDV: 40.1 % — LOW (ref 67–88)
SODIUM SERPL-SCNC: 144 MMOL/L — SIGNIFICANT CHANGE UP (ref 135–145)
SP GR SPEC: 1.01 — LOW (ref 1.01–1.02)
UROBILINOGEN FLD QL: NEGATIVE — SIGNIFICANT CHANGE UP
WBC # BLD: 4.64 K/UL — SIGNIFICANT CHANGE UP (ref 3.8–10.5)
WBC # FLD AUTO: 4.64 K/UL — SIGNIFICANT CHANGE UP (ref 3.8–10.5)
WBC UR QL: 2 /HPF — SIGNIFICANT CHANGE UP (ref 0–5)

## 2022-07-16 PROCEDURE — 85014 HEMATOCRIT: CPT

## 2022-07-16 PROCEDURE — 74177 CT ABD & PELVIS W/CONTRAST: CPT | Mod: MA

## 2022-07-16 PROCEDURE — 80053 COMPREHEN METABOLIC PANEL: CPT

## 2022-07-16 PROCEDURE — 82803 BLOOD GASES ANY COMBINATION: CPT

## 2022-07-16 PROCEDURE — 71045 X-RAY EXAM CHEST 1 VIEW: CPT

## 2022-07-16 PROCEDURE — 85025 COMPLETE CBC W/AUTO DIFF WBC: CPT

## 2022-07-16 PROCEDURE — 96375 TX/PRO/DX INJ NEW DRUG ADDON: CPT

## 2022-07-16 PROCEDURE — 96374 THER/PROPH/DIAG INJ IV PUSH: CPT | Mod: XU

## 2022-07-16 PROCEDURE — 84132 ASSAY OF SERUM POTASSIUM: CPT

## 2022-07-16 PROCEDURE — 84295 ASSAY OF SERUM SODIUM: CPT

## 2022-07-16 PROCEDURE — 99285 EMERGENCY DEPT VISIT HI MDM: CPT | Mod: 25

## 2022-07-16 PROCEDURE — 82435 ASSAY OF BLOOD CHLORIDE: CPT

## 2022-07-16 PROCEDURE — 82947 ASSAY GLUCOSE BLOOD QUANT: CPT

## 2022-07-16 PROCEDURE — 83690 ASSAY OF LIPASE: CPT

## 2022-07-16 PROCEDURE — 74177 CT ABD & PELVIS W/CONTRAST: CPT | Mod: 26,MA

## 2022-07-16 PROCEDURE — 99285 EMERGENCY DEPT VISIT HI MDM: CPT

## 2022-07-16 PROCEDURE — 93005 ELECTROCARDIOGRAM TRACING: CPT

## 2022-07-16 PROCEDURE — 85018 HEMOGLOBIN: CPT

## 2022-07-16 PROCEDURE — 81001 URINALYSIS AUTO W/SCOPE: CPT

## 2022-07-16 PROCEDURE — 96361 HYDRATE IV INFUSION ADD-ON: CPT

## 2022-07-16 PROCEDURE — 71045 X-RAY EXAM CHEST 1 VIEW: CPT | Mod: 26

## 2022-07-16 PROCEDURE — 87086 URINE CULTURE/COLONY COUNT: CPT

## 2022-07-16 PROCEDURE — 83605 ASSAY OF LACTIC ACID: CPT

## 2022-07-16 PROCEDURE — 93010 ELECTROCARDIOGRAM REPORT: CPT

## 2022-07-16 PROCEDURE — 82330 ASSAY OF CALCIUM: CPT

## 2022-07-16 RX ORDER — CEFPODOXIME PROXETIL 100 MG
1 TABLET ORAL
Qty: 14 | Refills: 0
Start: 2022-07-16 | End: 2022-07-22

## 2022-07-16 RX ORDER — SODIUM CHLORIDE 9 MG/ML
500 INJECTION, SOLUTION INTRAVENOUS ONCE
Refills: 0 | Status: COMPLETED | OUTPATIENT
Start: 2022-07-16 | End: 2022-07-16

## 2022-07-16 RX ORDER — CEFTRIAXONE 500 MG/1
1000 INJECTION, POWDER, FOR SOLUTION INTRAMUSCULAR; INTRAVENOUS ONCE
Refills: 0 | Status: COMPLETED | OUTPATIENT
Start: 2022-07-16 | End: 2022-07-16

## 2022-07-16 RX ORDER — ONDANSETRON 8 MG/1
4 TABLET, FILM COATED ORAL ONCE
Refills: 0 | Status: COMPLETED | OUTPATIENT
Start: 2022-07-16 | End: 2022-07-16

## 2022-07-16 RX ORDER — FAMOTIDINE 10 MG/ML
20 INJECTION INTRAVENOUS ONCE
Refills: 0 | Status: COMPLETED | OUTPATIENT
Start: 2022-07-16 | End: 2022-07-16

## 2022-07-16 RX ADMIN — FAMOTIDINE 20 MILLIGRAM(S): 10 INJECTION INTRAVENOUS at 12:41

## 2022-07-16 RX ADMIN — SODIUM CHLORIDE 500 MILLILITER(S): 9 INJECTION, SOLUTION INTRAVENOUS at 14:00

## 2022-07-16 RX ADMIN — CEFTRIAXONE 100 MILLIGRAM(S): 500 INJECTION, POWDER, FOR SOLUTION INTRAMUSCULAR; INTRAVENOUS at 15:50

## 2022-07-16 RX ADMIN — ONDANSETRON 4 MILLIGRAM(S): 8 TABLET, FILM COATED ORAL at 12:40

## 2022-07-16 RX ADMIN — SODIUM CHLORIDE 500 MILLILITER(S): 9 INJECTION, SOLUTION INTRAVENOUS at 12:48

## 2022-07-16 NOTE — ED PROVIDER NOTE - NSFOLLOWUPINSTRUCTIONS_ED_ALL_ED_FT
Urinary Tract Infection    A urinary tract infection (UTI) is an infection of any part of the urinary tract, which includes the kidneys, ureters, bladder, and urethra. Risk factors include ignoring your need to urinate, wiping back to front if female, being an uncircumcised male, and having diabetes or a weak immune system. Symptoms include frequent urination, pain or burning with urination, foul smelling urine, cloudy urine, pain in the lower abdomen, blood in the urine, and fever. If you were prescribed an antibiotic medicine, take it as told by your health care provider. Do not stop taking the antibiotic even if you start to feel better.    Take Cefpodoxime twice a day for the next 7 days. Follow up with your primary care doctor in 2-3 days for further evaluation of your symptoms    SEEK IMMEDIATE MEDICAL CARE IF YOU HAVE ANY OF THE FOLLOWING SYMPTOMS: severe back or abdominal pain, fever, inability to keep fluids or medicine down, dizziness/lightheadedness, or a change in mental status.

## 2022-07-16 NOTE — ED PROVIDER NOTE - PROGRESS NOTE DETAILS
Dillon SINGH (PGY-3)  pt treated for uti, sent with cefpodoxime. states she is feeling better and is overal well appearing. will d/c to [facility] with pcp f/u and return precautions

## 2022-07-16 NOTE — ED PROVIDER NOTE - OBJECTIVE STATEMENT
Patient is a 77 year old female with PMH of cholecystectomy April 2022, Parkinson's disease and HLD  last endoscopy Jan 2022 but no colonoscopy presents to the ER complaining of abdominal pain x 2 days. Patient admits to intermittent nausea and vomiting for 6 months. Admits to anorexia and weight loss. States that she is unable to tolerate liquid or solids. Also admits to constipation and headaches that started 2 days ago. Patient's daughter states that the patient had the same complaint last month and went to the urgent care and her PCP, Dr. Mora who did blood work. No imaging was completed. Denies fever, CP, SOB and changes in bladder function.

## 2022-07-16 NOTE — ED ADULT TRIAGE NOTE - LANGUAGE ASSISTANCE NEEDED
Discharge instructions were given to the patient by Leonardo Soto.  
 
The patient left the Emergency Department ambulatory, alert and oriented and in no acute distress with 3 prescriptions. The patient was encouraged to call or return to the ED for worsening issues or problems and was encouraged to schedule a follow up appointment for continuing care. The patient verbalized understanding of discharge instructions and prescriptions, all questions were answered. The patient has no further concerns at this time.
Emergency Department Nursing Plan of Care The Nursing Plan of Care is developed from the Nursing assessment and Emergency Department Attending provider initial evaluation. The plan of care may be reviewed in the ED Provider note. The Plan of Care was developed with the following considerations:  
Patient / Family readiness to learn indicated by:verbalized understanding Persons(s) to be included in education: patient Barriers to Learning/Limitations:No 
 
Signed Yassine Wan RN   
9/11/2018   6:18 PM 
 
 
 
 
 

Pt with ? Insect bite to left base of scalp x 3 days. + increased pain and swelling. Some intermittent drainage. Pt also c/o nausea since inset bite.
No-Patient/Caregiver offered and refused free interpretation services.

## 2022-07-16 NOTE — ED PROVIDER NOTE - PATIENT PORTAL LINK FT
You can access the FollowMyHealth Patient Portal offered by [facility] by registering at the following website: [email or web address] By joining [facility]’s FollowMyHealth portal, you will also be able to view your health information using other applications (apps) compatible with our system.

## 2022-07-16 NOTE — ED PROVIDER NOTE - CLINICAL SUMMARY MEDICAL DECISION MAKING FREE TEXT BOX
Patient is a 77 year old female with intermittent chronic abdominal pain for 6 month with weight loss and poor appetite. Endoscopy in Jan 2022 negative, cholecystectomy in April 2022. Physical exam is remarkable for mild LLQ pain. EKG NSR and no acute changes. Will obtain blood work, UA, CT scan and reassess. ZR

## 2022-07-16 NOTE — ED ADULT NURSE NOTE - OBJECTIVE STATEMENT
1200 77 yr old  female brought to ER by daughter for further eval and tx of N/V  x 4 days and loss of appetite associated with generalized abd pain. A&Ox4. Denies HA, dizziness, fever, chills, dysuria chest pain, SOB or palp. Fall risk precautions maintained

## 2022-07-16 NOTE — ED PROVIDER NOTE - CARE PLAN
1 Principal Discharge DX:	Abdominal pain   Principal Discharge DX:	Abdominal pain  Secondary Diagnosis:	Acute UTI

## 2022-07-17 LAB
CULTURE RESULTS: SIGNIFICANT CHANGE UP
SPECIMEN SOURCE: SIGNIFICANT CHANGE UP

## 2022-08-22 ENCOUNTER — EMERGENCY (EMERGENCY)
Facility: HOSPITAL | Age: 77
LOS: 1 days | Discharge: HOME | End: 2022-08-22
Attending: STUDENT IN AN ORGANIZED HEALTH CARE EDUCATION/TRAINING PROGRAM
Payer: MEDICARE

## 2022-08-22 VITALS
DIASTOLIC BLOOD PRESSURE: 63 MMHG | SYSTOLIC BLOOD PRESSURE: 94 MMHG | HEIGHT: 63 IN | RESPIRATION RATE: 18 BRPM | OXYGEN SATURATION: 96 % | TEMPERATURE: 99 F | WEIGHT: 128.09 LBS | HEART RATE: 92 BPM

## 2022-08-22 VITALS
SYSTOLIC BLOOD PRESSURE: 108 MMHG | HEART RATE: 78 BPM | DIASTOLIC BLOOD PRESSURE: 62 MMHG | OXYGEN SATURATION: 97 % | TEMPERATURE: 98 F | RESPIRATION RATE: 18 BRPM

## 2022-08-22 DIAGNOSIS — Z90.710 ACQUIRED ABSENCE OF BOTH CERVIX AND UTERUS: Chronic | ICD-10-CM

## 2022-08-22 DIAGNOSIS — Z96.659 PRESENCE OF UNSPECIFIED ARTIFICIAL KNEE JOINT: Chronic | ICD-10-CM

## 2022-08-22 DIAGNOSIS — Z98.890 OTHER SPECIFIED POSTPROCEDURAL STATES: Chronic | ICD-10-CM

## 2022-08-22 LAB
ALBUMIN SERPL ELPH-MCNC: 3.8 G/DL — SIGNIFICANT CHANGE UP (ref 3.3–5)
ALP SERPL-CCNC: 66 U/L — SIGNIFICANT CHANGE UP (ref 40–120)
ALT FLD-CCNC: 5 U/L — LOW (ref 10–45)
ANION GAP SERPL CALC-SCNC: 9 MMOL/L — SIGNIFICANT CHANGE UP (ref 5–17)
APPEARANCE UR: CLEAR — SIGNIFICANT CHANGE UP
APTT BLD: 31.1 SEC — SIGNIFICANT CHANGE UP (ref 27.5–35.5)
AST SERPL-CCNC: 10 U/L — SIGNIFICANT CHANGE UP (ref 10–40)
BACTERIA # UR AUTO: NEGATIVE — SIGNIFICANT CHANGE UP
BASE EXCESS BLDV CALC-SCNC: 4 MMOL/L — HIGH (ref -2–3)
BASOPHILS # BLD AUTO: 0 K/UL — SIGNIFICANT CHANGE UP (ref 0–0.2)
BASOPHILS NFR BLD AUTO: 0 % — SIGNIFICANT CHANGE UP (ref 0–2)
BILIRUB SERPL-MCNC: 0.4 MG/DL — SIGNIFICANT CHANGE UP (ref 0.2–1.2)
BILIRUB UR-MCNC: NEGATIVE — SIGNIFICANT CHANGE UP
BUN SERPL-MCNC: 12 MG/DL — SIGNIFICANT CHANGE UP (ref 7–23)
CA-I SERPL-SCNC: 1.22 MMOL/L — SIGNIFICANT CHANGE UP (ref 1.15–1.33)
CALCIUM SERPL-MCNC: 9.3 MG/DL — SIGNIFICANT CHANGE UP (ref 8.4–10.5)
CHLORIDE BLDV-SCNC: 102 MMOL/L — SIGNIFICANT CHANGE UP (ref 96–108)
CHLORIDE SERPL-SCNC: 103 MMOL/L — SIGNIFICANT CHANGE UP (ref 96–108)
CO2 BLDV-SCNC: 32 MMOL/L — HIGH (ref 22–26)
CO2 SERPL-SCNC: 28 MMOL/L — SIGNIFICANT CHANGE UP (ref 22–31)
COLOR SPEC: COLORLESS — SIGNIFICANT CHANGE UP
CREAT SERPL-MCNC: 0.64 MG/DL — SIGNIFICANT CHANGE UP (ref 0.5–1.3)
DIFF PNL FLD: ABNORMAL
EGFR: 90 ML/MIN/1.73M2 — SIGNIFICANT CHANGE UP
EOSINOPHIL # BLD AUTO: 0.71 K/UL — HIGH (ref 0–0.5)
EOSINOPHIL NFR BLD AUTO: 11.8 % — HIGH (ref 0–6)
EPI CELLS # UR: 1 /HPF — SIGNIFICANT CHANGE UP
FLUAV AG NPH QL: SIGNIFICANT CHANGE UP
FLUBV AG NPH QL: SIGNIFICANT CHANGE UP
GAS PNL BLDV: 135 MMOL/L — LOW (ref 136–145)
GAS PNL BLDV: SIGNIFICANT CHANGE UP
GAS PNL BLDV: SIGNIFICANT CHANGE UP
GLUCOSE BLDV-MCNC: 106 MG/DL — HIGH (ref 70–99)
GLUCOSE SERPL-MCNC: 109 MG/DL — HIGH (ref 70–99)
GLUCOSE UR QL: NEGATIVE — SIGNIFICANT CHANGE UP
HCO3 BLDV-SCNC: 30 MMOL/L — HIGH (ref 22–29)
HCT VFR BLD CALC: 39 % — SIGNIFICANT CHANGE UP (ref 34.5–45)
HCT VFR BLDA CALC: 39 % — SIGNIFICANT CHANGE UP (ref 34.5–46.5)
HGB BLD CALC-MCNC: 13 G/DL — SIGNIFICANT CHANGE UP (ref 11.7–16.1)
HGB BLD-MCNC: 12.5 G/DL — SIGNIFICANT CHANGE UP (ref 11.5–15.5)
HYALINE CASTS # UR AUTO: 1 /LPF — SIGNIFICANT CHANGE UP (ref 0–2)
IMM GRANULOCYTES NFR BLD AUTO: 0.5 % — SIGNIFICANT CHANGE UP (ref 0–1.5)
INR BLD: 1.09 RATIO — SIGNIFICANT CHANGE UP (ref 0.88–1.16)
KETONES UR-MCNC: NEGATIVE — SIGNIFICANT CHANGE UP
LACTATE BLDV-MCNC: 1.4 MMOL/L — SIGNIFICANT CHANGE UP (ref 0.5–2)
LEUKOCYTE ESTERASE UR-ACNC: NEGATIVE — SIGNIFICANT CHANGE UP
LIDOCAIN IGE QN: 14 U/L — SIGNIFICANT CHANGE UP (ref 7–60)
LYMPHOCYTES # BLD AUTO: 0.36 K/UL — LOW (ref 1–3.3)
LYMPHOCYTES # BLD AUTO: 6 % — LOW (ref 13–44)
MCHC RBC-ENTMCNC: 29.6 PG — SIGNIFICANT CHANGE UP (ref 27–34)
MCHC RBC-ENTMCNC: 32.1 GM/DL — SIGNIFICANT CHANGE UP (ref 32–36)
MCV RBC AUTO: 92.2 FL — SIGNIFICANT CHANGE UP (ref 80–100)
MONOCYTES # BLD AUTO: 0.2 K/UL — SIGNIFICANT CHANGE UP (ref 0–0.9)
MONOCYTES NFR BLD AUTO: 3.3 % — SIGNIFICANT CHANGE UP (ref 2–14)
NEUTROPHILS # BLD AUTO: 4.71 K/UL — SIGNIFICANT CHANGE UP (ref 1.8–7.4)
NEUTROPHILS NFR BLD AUTO: 78.4 % — HIGH (ref 43–77)
NITRITE UR-MCNC: NEGATIVE — SIGNIFICANT CHANGE UP
NRBC # BLD: 0 /100 WBCS — SIGNIFICANT CHANGE UP (ref 0–0)
PCO2 BLDV: 51 MMHG — HIGH (ref 39–42)
PH BLDV: 7.38 — SIGNIFICANT CHANGE UP (ref 7.32–7.43)
PH UR: 7 — SIGNIFICANT CHANGE UP (ref 5–8)
PLATELET # BLD AUTO: 192 K/UL — SIGNIFICANT CHANGE UP (ref 150–400)
PO2 BLDV: 31 MMHG — SIGNIFICANT CHANGE UP (ref 25–45)
POTASSIUM BLDV-SCNC: 4 MMOL/L — SIGNIFICANT CHANGE UP (ref 3.5–5.1)
POTASSIUM SERPL-MCNC: 4.3 MMOL/L — SIGNIFICANT CHANGE UP (ref 3.5–5.3)
POTASSIUM SERPL-SCNC: 4.3 MMOL/L — SIGNIFICANT CHANGE UP (ref 3.5–5.3)
PROT SERPL-MCNC: 6.6 G/DL — SIGNIFICANT CHANGE UP (ref 6–8.3)
PROT UR-MCNC: NEGATIVE — SIGNIFICANT CHANGE UP
PROTHROM AB SERPL-ACNC: 12.5 SEC — SIGNIFICANT CHANGE UP (ref 10.5–13.4)
RBC # BLD: 4.23 M/UL — SIGNIFICANT CHANGE UP (ref 3.8–5.2)
RBC # FLD: 13.2 % — SIGNIFICANT CHANGE UP (ref 10.3–14.5)
RBC CASTS # UR COMP ASSIST: 10 /HPF — HIGH (ref 0–4)
RSV RNA NPH QL NAA+NON-PROBE: SIGNIFICANT CHANGE UP
SAO2 % BLDV: 47.7 % — LOW (ref 67–88)
SARS-COV-2 RNA SPEC QL NAA+PROBE: SIGNIFICANT CHANGE UP
SODIUM SERPL-SCNC: 140 MMOL/L — SIGNIFICANT CHANGE UP (ref 135–145)
SP GR SPEC: 1.02 — SIGNIFICANT CHANGE UP (ref 1.01–1.02)
TROPONIN T, HIGH SENSITIVITY RESULT: 14 NG/L — SIGNIFICANT CHANGE UP (ref 0–51)
TROPONIN T, HIGH SENSITIVITY RESULT: 14 NG/L — SIGNIFICANT CHANGE UP (ref 0–51)
UROBILINOGEN FLD QL: NEGATIVE — SIGNIFICANT CHANGE UP
WBC # BLD: 6.01 K/UL — SIGNIFICANT CHANGE UP (ref 3.8–10.5)
WBC # FLD AUTO: 6.01 K/UL — SIGNIFICANT CHANGE UP (ref 3.8–10.5)
WBC UR QL: 1 /HPF — SIGNIFICANT CHANGE UP (ref 0–5)

## 2022-08-22 PROCEDURE — 85025 COMPLETE CBC W/AUTO DIFF WBC: CPT

## 2022-08-22 PROCEDURE — 36415 COLL VENOUS BLD VENIPUNCTURE: CPT

## 2022-08-22 PROCEDURE — 93010 ELECTROCARDIOGRAM REPORT: CPT

## 2022-08-22 PROCEDURE — 82435 ASSAY OF BLOOD CHLORIDE: CPT

## 2022-08-22 PROCEDURE — 99285 EMERGENCY DEPT VISIT HI MDM: CPT | Mod: 25

## 2022-08-22 PROCEDURE — 81001 URINALYSIS AUTO W/SCOPE: CPT

## 2022-08-22 PROCEDURE — 84295 ASSAY OF SERUM SODIUM: CPT

## 2022-08-22 PROCEDURE — 84484 ASSAY OF TROPONIN QUANT: CPT

## 2022-08-22 PROCEDURE — 93005 ELECTROCARDIOGRAM TRACING: CPT

## 2022-08-22 PROCEDURE — 87637 SARSCOV2&INF A&B&RSV AMP PRB: CPT

## 2022-08-22 PROCEDURE — 85014 HEMATOCRIT: CPT

## 2022-08-22 PROCEDURE — 83605 ASSAY OF LACTIC ACID: CPT

## 2022-08-22 PROCEDURE — 82803 BLOOD GASES ANY COMBINATION: CPT

## 2022-08-22 PROCEDURE — 85610 PROTHROMBIN TIME: CPT

## 2022-08-22 PROCEDURE — 83690 ASSAY OF LIPASE: CPT

## 2022-08-22 PROCEDURE — 71045 X-RAY EXAM CHEST 1 VIEW: CPT

## 2022-08-22 PROCEDURE — 85730 THROMBOPLASTIN TIME PARTIAL: CPT

## 2022-08-22 PROCEDURE — 82330 ASSAY OF CALCIUM: CPT

## 2022-08-22 PROCEDURE — 74177 CT ABD & PELVIS W/CONTRAST: CPT | Mod: MA

## 2022-08-22 PROCEDURE — 87086 URINE CULTURE/COLONY COUNT: CPT

## 2022-08-22 PROCEDURE — 84132 ASSAY OF SERUM POTASSIUM: CPT

## 2022-08-22 PROCEDURE — 87040 BLOOD CULTURE FOR BACTERIA: CPT

## 2022-08-22 PROCEDURE — 71045 X-RAY EXAM CHEST 1 VIEW: CPT | Mod: 26

## 2022-08-22 PROCEDURE — 82947 ASSAY GLUCOSE BLOOD QUANT: CPT

## 2022-08-22 PROCEDURE — 85018 HEMOGLOBIN: CPT

## 2022-08-22 PROCEDURE — 74177 CT ABD & PELVIS W/CONTRAST: CPT | Mod: 26,MA

## 2022-08-22 PROCEDURE — 80053 COMPREHEN METABOLIC PANEL: CPT

## 2022-08-22 RX ORDER — SODIUM CHLORIDE 9 MG/ML
1000 INJECTION INTRAMUSCULAR; INTRAVENOUS; SUBCUTANEOUS ONCE
Refills: 0 | Status: COMPLETED | OUTPATIENT
Start: 2022-08-22 | End: 2022-08-22

## 2022-08-22 RX ORDER — ACETAMINOPHEN 500 MG
650 TABLET ORAL ONCE
Refills: 0 | Status: COMPLETED | OUTPATIENT
Start: 2022-08-22 | End: 2022-08-22

## 2022-08-22 RX ADMIN — Medication 650 MILLIGRAM(S): at 09:19

## 2022-08-22 RX ADMIN — SODIUM CHLORIDE 1000 MILLILITER(S): 9 INJECTION INTRAMUSCULAR; INTRAVENOUS; SUBCUTANEOUS at 09:19

## 2022-08-22 NOTE — ED ADULT NURSE NOTE - PAIN RATING/NUMBER SCALE (0-10): REST
Received request to call Pt's Guardian Aylin: [phone]  Returned Aylin's call & updated her with Pt's appts:  q 4-6 week port flushes, CT on 7-11-17, Exam on 7-13-17 at 1:30pm with .    Guardian Aylin said that she will try to accompany Pt to his 7-13-17 exam appt.  
4

## 2022-08-22 NOTE — ED PROVIDER NOTE - CLINICAL SUMMARY MEDICAL DECISION MAKING FREE TEXT BOX
78 yo F parkinson's recent uti p/w 1 week of generalized weakness HA dizziness abd pain. VSS. abd ttp LLQ/ suprapubic. c/f infectious uti vs pna vs uri/covid. c/f metabolic vs e- abnormality vs cardiac. c/f diverticulitis vs colitis. lower c/f stone. plan labs ivf meds ua CT CXR ekg and r/a

## 2022-08-22 NOTE — ED ADULT NURSE NOTE - OBJECTIVE STATEMENT
77F, AAO4, c/o generalized body aches, weakness, dizziness, poor PO. Daughter reports symptoms ongoing for approx 1 week. Reports slight lower abdominal discomfort w/ urination, denies dark/tarry/bloody stool. Abdomen soft, non distended, non tender, skin pale intact. MURRELL, follows commands. RR even and unlabored at rest.

## 2022-08-22 NOTE — ED ADULT NURSE NOTE - CAS DISCH CONDITION
Request for: trazodone 50 mg tab, take 1 tab nightly  Last prescribed: 12/8/17   #30   Refills: 0    Next visit: 3/20/18  No show(s)/pt cancel: 12/19/17    Verified dosage against providers last note on 8/21/17.      Approve 1 month supply with 2 refills.    
Improved

## 2022-08-22 NOTE — ED PROVIDER NOTE - OBJECTIVE STATEMENT
76 yo F pmhx parkinsons hld lap cliff 4/22, recent visit to this ed 1 month ago found to have uti p/w 1 week of weakness HA dizziness and abd pain. Kiswahili speaking  [id number] used. pt has felt generalized weakness. low grade temps 99 orally. genralized abd pain with decrease po intake. no nausea or vomiting. last bm yesterday regular non bloody. pt went to pcp for lower abd pressure given macrobid taken 4 days. no dysuria frequency or hematuria. decrease ability to ambulate 2/2 genral weakness. pt also reporting intermittent dizziness and HA. no changes in vision. no sick contacts. no sore throat congestion cough.

## 2022-08-22 NOTE — ED PROVIDER NOTE - NSICDXPASTSURGICALHX_GEN_ALL_CORE_FT
Fall
PAST SURGICAL HISTORY:  H/O total knee replacement     H/O: hysterectomy     History of bladder surgery

## 2022-08-22 NOTE — ED PROVIDER NOTE - PHYSICAL EXAMINATION
Gen: NAD, non-toxic appearing  Head: normal appearing  HEENT: normal conjunctiva, oral mucosa dry  Lung: no respiratory distress, speaking in full sentences, CTA b/l     CV: regular rate and rhythm, no murmurs  Abd: soft, non distended, TTP LLQ suprapubic  MSK: no visible deformities  Neuro: No focal deficits, AAOx3  Skin: Warm  Psych: normal affect Gen: NAD, non-toxic appearing  Head: NC AT  HEENT: normal conjunctiva, oral mucosa dry  Lung: no respiratory distress, speaking in full sentences, CTA b/l     CV: regular rate and rhythm, no murmurs  Abd: soft, non distended, TTP LLQ suprapubic; no CVA tenderness  MSK: no visible deformities  Neuro: No focal deficits, AAOx3  Skin: Warm  Psych: normal affect

## 2022-08-22 NOTE — ED PROVIDER NOTE - NSFOLLOWUPCLINICS_GEN_ALL_ED_FT
[facility]  Neurology  [address] [place] [address] [address] [place] [address]  Phone: [phone]  Fax:

## 2022-08-22 NOTE — ED PROVIDER NOTE - PROGRESS NOTE DETAILS
Patrick SINGH PGY2: labs and imaging negative for emergent pathology at this time. pt and daughter o k with plan for discharge with neuro follow up and return precautions.

## 2022-08-22 NOTE — ED PROCEDURE NOTE - PROCEDURE ADDITIONAL DETAILS
Emergency Department Focused Ultrasound performed at patient's bedside for educational purposes. The study will have a follow up study performed or was performed in the direct supervision of an ultrasound trained attending.     f/u ctap

## 2022-08-22 NOTE — ED PROVIDER NOTE - ATTENDING CONTRIBUTION TO CARE
I, Adam De Anda, performed a history and physical exam of the patient and discussed their management with the resident and/or advanced care provider. I reviewed the resident and/or advanced care provider's note and agree with the documented findings and plan of care except where noted. I was present and available for all procedures.     [place]  ID #[id number] used  78 yo F PMHx Parkinson's disease, HLD, s/p cholecystectomy (April 2022) presents to the eD for generalized weakness, dizziness, headache, and abdominal pain. She was seen here 1 month ago and was treated for a UTI which pt completed. She then went to her PMD who rx'd macrobid. She has had low grade temps of 99 but no recorded fevers. has been having decreased PO intake but denies n/v. Denies dysuria, hematuria, urinary frequency, cp, sob, numbness, weakness, tingling.   Pt otherwise well appearing, non toxic. dry oral mucosa. abd soft, tender to LLQ w/o rebound or guarding. no cva tenderness. lungs cta. agree with above MDM: check labs, UA, CTAP, IV fluids, reassess

## 2022-08-23 LAB
CULTURE RESULTS: SIGNIFICANT CHANGE UP
SPECIMEN SOURCE: SIGNIFICANT CHANGE UP

## 2022-10-17 ENCOUNTER — APPOINTMENT (OUTPATIENT)
Dept: NEUROLOGY | Facility: CLINIC | Age: 77
End: 2022-10-17

## 2022-10-17 ENCOUNTER — INPATIENT (INPATIENT)
Facility: HOSPITAL | Age: 77
LOS: 1 days | Discharge: HOME HEALTH | DRG: 446 | End: 2022-10-19
Attending: INTERNAL MEDICINE | Admitting: INTERNAL MEDICINE
Payer: MEDICARE

## 2022-10-17 VITALS
RESPIRATION RATE: 17 BRPM | WEIGHT: 123.9 LBS | SYSTOLIC BLOOD PRESSURE: 126 MMHG | HEIGHT: 63 IN | DIASTOLIC BLOOD PRESSURE: 77 MMHG | OXYGEN SATURATION: 97 % | TEMPERATURE: 98 F | HEART RATE: 82 BPM

## 2022-10-17 DIAGNOSIS — Z90.710 ACQUIRED ABSENCE OF BOTH CERVIX AND UTERUS: Chronic | ICD-10-CM

## 2022-10-17 DIAGNOSIS — R10.9 UNSPECIFIED ABDOMINAL PAIN: ICD-10-CM

## 2022-10-17 DIAGNOSIS — Z96.659 PRESENCE OF UNSPECIFIED ARTIFICIAL KNEE JOINT: Chronic | ICD-10-CM

## 2022-10-17 DIAGNOSIS — Z98.890 OTHER SPECIFIED POSTPROCEDURAL STATES: Chronic | ICD-10-CM

## 2022-10-17 LAB
ALBUMIN SERPL ELPH-MCNC: 4.1 G/DL — SIGNIFICANT CHANGE UP (ref 3.3–5)
ALP SERPL-CCNC: 61 U/L — SIGNIFICANT CHANGE UP (ref 40–120)
ALT FLD-CCNC: 6 U/L — LOW (ref 10–45)
ANION GAP SERPL CALC-SCNC: 11 MMOL/L — SIGNIFICANT CHANGE UP (ref 5–17)
APPEARANCE UR: CLEAR — SIGNIFICANT CHANGE UP
AST SERPL-CCNC: 13 U/L — SIGNIFICANT CHANGE UP (ref 10–40)
BACTERIA # UR AUTO: NEGATIVE — SIGNIFICANT CHANGE UP
BASOPHILS # BLD AUTO: 0.02 K/UL — SIGNIFICANT CHANGE UP (ref 0–0.2)
BASOPHILS NFR BLD AUTO: 0.4 % — SIGNIFICANT CHANGE UP (ref 0–2)
BILIRUB SERPL-MCNC: 0.4 MG/DL — SIGNIFICANT CHANGE UP (ref 0.2–1.2)
BILIRUB UR-MCNC: NEGATIVE — SIGNIFICANT CHANGE UP
BUN SERPL-MCNC: 15 MG/DL — SIGNIFICANT CHANGE UP (ref 7–23)
CALCIUM SERPL-MCNC: 9.3 MG/DL — SIGNIFICANT CHANGE UP (ref 8.4–10.5)
CHLORIDE SERPL-SCNC: 106 MMOL/L — SIGNIFICANT CHANGE UP (ref 96–108)
CO2 SERPL-SCNC: 26 MMOL/L — SIGNIFICANT CHANGE UP (ref 22–31)
COLOR SPEC: SIGNIFICANT CHANGE UP
CREAT SERPL-MCNC: 0.6 MG/DL — SIGNIFICANT CHANGE UP (ref 0.5–1.3)
DIFF PNL FLD: ABNORMAL
EGFR: 92 ML/MIN/1.73M2 — SIGNIFICANT CHANGE UP
EOSINOPHIL # BLD AUTO: 0.07 K/UL — SIGNIFICANT CHANGE UP (ref 0–0.5)
EOSINOPHIL NFR BLD AUTO: 1.4 % — SIGNIFICANT CHANGE UP (ref 0–6)
EPI CELLS # UR: 0 /HPF — SIGNIFICANT CHANGE UP
GLUCOSE SERPL-MCNC: 91 MG/DL — SIGNIFICANT CHANGE UP (ref 70–99)
GLUCOSE UR QL: NEGATIVE — SIGNIFICANT CHANGE UP
HCT VFR BLD CALC: 35.7 % — SIGNIFICANT CHANGE UP (ref 34.5–45)
HGB BLD-MCNC: 11.4 G/DL — LOW (ref 11.5–15.5)
HYALINE CASTS # UR AUTO: 1 /LPF — SIGNIFICANT CHANGE UP (ref 0–2)
IMM GRANULOCYTES NFR BLD AUTO: 0.4 % — SIGNIFICANT CHANGE UP (ref 0–0.9)
KETONES UR-MCNC: ABNORMAL
LEUKOCYTE ESTERASE UR-ACNC: NEGATIVE — SIGNIFICANT CHANGE UP
LIDOCAIN IGE QN: 19 U/L — SIGNIFICANT CHANGE UP (ref 7–60)
LYMPHOCYTES # BLD AUTO: 0.95 K/UL — LOW (ref 1–3.3)
LYMPHOCYTES # BLD AUTO: 19.2 % — SIGNIFICANT CHANGE UP (ref 13–44)
MCHC RBC-ENTMCNC: 29.3 PG — SIGNIFICANT CHANGE UP (ref 27–34)
MCHC RBC-ENTMCNC: 31.9 GM/DL — LOW (ref 32–36)
MCV RBC AUTO: 91.8 FL — SIGNIFICANT CHANGE UP (ref 80–100)
MONOCYTES # BLD AUTO: 0.32 K/UL — SIGNIFICANT CHANGE UP (ref 0–0.9)
MONOCYTES NFR BLD AUTO: 6.5 % — SIGNIFICANT CHANGE UP (ref 2–14)
NEUTROPHILS # BLD AUTO: 3.57 K/UL — SIGNIFICANT CHANGE UP (ref 1.8–7.4)
NEUTROPHILS NFR BLD AUTO: 72.1 % — SIGNIFICANT CHANGE UP (ref 43–77)
NITRITE UR-MCNC: NEGATIVE — SIGNIFICANT CHANGE UP
NRBC # BLD: 0 /100 WBCS — SIGNIFICANT CHANGE UP (ref 0–0)
PH UR: 6 — SIGNIFICANT CHANGE UP (ref 5–8)
PLATELET # BLD AUTO: 194 K/UL — SIGNIFICANT CHANGE UP (ref 150–400)
POTASSIUM SERPL-MCNC: 3.9 MMOL/L — SIGNIFICANT CHANGE UP (ref 3.5–5.3)
POTASSIUM SERPL-SCNC: 3.9 MMOL/L — SIGNIFICANT CHANGE UP (ref 3.5–5.3)
PROT SERPL-MCNC: 6.3 G/DL — SIGNIFICANT CHANGE UP (ref 6–8.3)
PROT UR-MCNC: NEGATIVE — SIGNIFICANT CHANGE UP
RBC # BLD: 3.89 M/UL — SIGNIFICANT CHANGE UP (ref 3.8–5.2)
RBC # FLD: 13.4 % — SIGNIFICANT CHANGE UP (ref 10.3–14.5)
RBC CASTS # UR COMP ASSIST: 17 /HPF — HIGH (ref 0–4)
SODIUM SERPL-SCNC: 143 MMOL/L — SIGNIFICANT CHANGE UP (ref 135–145)
SP GR SPEC: 1.01 — LOW (ref 1.01–1.02)
TROPONIN T, HIGH SENSITIVITY RESULT: 11 NG/L — SIGNIFICANT CHANGE UP (ref 0–51)
UROBILINOGEN FLD QL: NEGATIVE — SIGNIFICANT CHANGE UP
WBC # BLD: 4.95 K/UL — SIGNIFICANT CHANGE UP (ref 3.8–10.5)
WBC # FLD AUTO: 4.95 K/UL — SIGNIFICANT CHANGE UP (ref 3.8–10.5)
WBC UR QL: 1 /HPF — SIGNIFICANT CHANGE UP (ref 0–5)

## 2022-10-17 PROCEDURE — 74177 CT ABD & PELVIS W/CONTRAST: CPT | Mod: 26,MA

## 2022-10-17 PROCEDURE — 99285 EMERGENCY DEPT VISIT HI MDM: CPT

## 2022-10-17 PROCEDURE — 93010 ELECTROCARDIOGRAM REPORT: CPT

## 2022-10-17 PROCEDURE — 71045 X-RAY EXAM CHEST 1 VIEW: CPT | Mod: 26

## 2022-10-17 PROCEDURE — 73564 X-RAY EXAM KNEE 4 OR MORE: CPT | Mod: 26,LT

## 2022-10-17 PROCEDURE — 76705 ECHO EXAM OF ABDOMEN: CPT | Mod: 26

## 2022-10-17 RX ORDER — ACETAMINOPHEN 500 MG
2 TABLET ORAL
Qty: 0 | Refills: 0 | DISCHARGE

## 2022-10-17 RX ORDER — ASPIRIN/CALCIUM CARB/MAGNESIUM 324 MG
1 TABLET ORAL
Qty: 0 | Refills: 0 | DISCHARGE

## 2022-10-17 RX ORDER — ATORVASTATIN CALCIUM 80 MG/1
1 TABLET, FILM COATED ORAL
Qty: 0 | Refills: 0 | DISCHARGE

## 2022-10-17 RX ORDER — FAMOTIDINE 10 MG/ML
20 INJECTION INTRAVENOUS ONCE
Refills: 0 | Status: COMPLETED | OUTPATIENT
Start: 2022-10-17 | End: 2022-10-17

## 2022-10-17 RX ORDER — ACETAMINOPHEN 500 MG
1000 TABLET ORAL ONCE
Refills: 0 | Status: COMPLETED | OUTPATIENT
Start: 2022-10-17 | End: 2022-10-17

## 2022-10-17 RX ORDER — LORATADINE 10 MG/1
10 TABLET ORAL DAILY
Refills: 0 | Status: DISCONTINUED | OUTPATIENT
Start: 2022-10-17 | End: 2022-10-19

## 2022-10-17 RX ORDER — PANTOPRAZOLE SODIUM 20 MG/1
40 TABLET, DELAYED RELEASE ORAL
Refills: 0 | Status: DISCONTINUED | OUTPATIENT
Start: 2022-10-17 | End: 2022-10-19

## 2022-10-17 RX ORDER — ESOMEPRAZOLE MAGNESIUM 40 MG/1
1 CAPSULE, DELAYED RELEASE ORAL
Qty: 0 | Refills: 0 | DISCHARGE

## 2022-10-17 RX ORDER — LORATADINE 10 MG/1
1 TABLET ORAL
Qty: 0 | Refills: 0 | DISCHARGE

## 2022-10-17 RX ORDER — MECLIZINE HCL 12.5 MG
0 TABLET ORAL
Qty: 0 | Refills: 0 | DISCHARGE

## 2022-10-17 RX ORDER — CARBIDOPA AND LEVODOPA 25; 100 MG/1; MG/1
1 TABLET ORAL
Qty: 0 | Refills: 0 | DISCHARGE

## 2022-10-17 RX ORDER — ROPINIROLE 8 MG/1
1 TABLET, FILM COATED, EXTENDED RELEASE ORAL
Qty: 0 | Refills: 0 | DISCHARGE

## 2022-10-17 RX ORDER — CYCLOSPORINE 0.5 MG/ML
1 EMULSION OPHTHALMIC
Qty: 0 | Refills: 0 | DISCHARGE

## 2022-10-17 RX ORDER — ICOSAPENT ETHYL 500 MG/1
1 CAPSULE, LIQUID FILLED ORAL
Qty: 0 | Refills: 0 | DISCHARGE

## 2022-10-17 RX ORDER — ATORVASTATIN CALCIUM 80 MG/1
10 TABLET, FILM COATED ORAL AT BEDTIME
Refills: 0 | Status: DISCONTINUED | OUTPATIENT
Start: 2022-10-17 | End: 2022-10-19

## 2022-10-17 RX ORDER — PROCYCLIDINE HCL 5 MG
1 TABLET ORAL
Qty: 0 | Refills: 0 | DISCHARGE

## 2022-10-17 RX ORDER — MECLIZINE HCL 12.5 MG
12.5 TABLET ORAL EVERY 8 HOURS
Refills: 0 | Status: DISCONTINUED | OUTPATIENT
Start: 2022-10-17 | End: 2022-10-19

## 2022-10-17 RX ORDER — CARBIDOPA AND LEVODOPA 25; 100 MG/1; MG/1
1 TABLET ORAL THREE TIMES A DAY
Refills: 0 | Status: DISCONTINUED | OUTPATIENT
Start: 2022-10-17 | End: 2022-10-19

## 2022-10-17 RX ORDER — KETOROLAC TROMETHAMINE 30 MG/ML
15 SYRINGE (ML) INJECTION ONCE
Refills: 0 | Status: DISCONTINUED | OUTPATIENT
Start: 2022-10-17 | End: 2022-10-17

## 2022-10-17 RX ORDER — SODIUM CHLORIDE 9 MG/ML
1000 INJECTION, SOLUTION INTRAVENOUS ONCE
Refills: 0 | Status: COMPLETED | OUTPATIENT
Start: 2022-10-17 | End: 2022-10-17

## 2022-10-17 RX ORDER — ACETAMINOPHEN 500 MG
650 TABLET ORAL EVERY 6 HOURS
Refills: 0 | Status: DISCONTINUED | OUTPATIENT
Start: 2022-10-17 | End: 2022-10-19

## 2022-10-17 RX ORDER — MECLIZINE HCL 12.5 MG
1 TABLET ORAL
Qty: 0 | Refills: 0 | DISCHARGE

## 2022-10-17 RX ORDER — ENOXAPARIN SODIUM 100 MG/ML
30 INJECTION SUBCUTANEOUS EVERY 24 HOURS
Refills: 0 | Status: DISCONTINUED | OUTPATIENT
Start: 2022-10-17 | End: 2022-10-19

## 2022-10-17 RX ORDER — ASPIRIN/CALCIUM CARB/MAGNESIUM 324 MG
81 TABLET ORAL DAILY
Refills: 0 | Status: DISCONTINUED | OUTPATIENT
Start: 2022-10-17 | End: 2022-10-19

## 2022-10-17 RX ADMIN — FAMOTIDINE 20 MILLIGRAM(S): 10 INJECTION INTRAVENOUS at 14:18

## 2022-10-17 RX ADMIN — SODIUM CHLORIDE 1000 MILLILITER(S): 9 INJECTION, SOLUTION INTRAVENOUS at 18:26

## 2022-10-17 RX ADMIN — CARBIDOPA AND LEVODOPA 1 TABLET(S): 25; 100 TABLET ORAL at 22:03

## 2022-10-17 RX ADMIN — ATORVASTATIN CALCIUM 10 MILLIGRAM(S): 80 TABLET, FILM COATED ORAL at 22:03

## 2022-10-17 RX ADMIN — Medication 400 MILLIGRAM(S): at 17:56

## 2022-10-17 RX ADMIN — Medication 30 MILLILITER(S): at 14:18

## 2022-10-17 RX ADMIN — Medication 15 MILLIGRAM(S): at 14:18

## 2022-10-17 NOTE — ED ADULT NURSE NOTE - OBJECTIVE STATEMENT
Pt presented to ed with c/o abdominal pain that started few weeks ago with worsening of symptoms today along with subjective fever, chills, headache and dizziness. pt denies nausea, vomiting, sob, chest pain. Will continue to monitor.

## 2022-10-17 NOTE — ED PROVIDER NOTE - SHIFT CHANGE DETAILS
rolling walker
Attending MD Gregorio: 77F w abd pain, had cholecystectomy in April, chronic epigastric pain, had Emergency Department visit 1 mo ago with similar symptoms, at that time CT nonactionable, pending CTAP and US, cardiac/abd work up, GI cocktail, if nonactionable, outpatient follow up

## 2022-10-17 NOTE — ED PROCEDURE NOTE - ATTENDING CONTRIBUTION TO CARE
I, Zay Vicente, performed a history and physical exam of the patient and discussed their management with the resident and /or advanced care provider. I reviewed the resident and /or ACP's note and agree with the documented findings and plan of care. I was present and available for all procedures.

## 2022-10-17 NOTE — ED PROVIDER NOTE - OBJECTIVE STATEMENT
This is a 77 year old female with PMH of cholecystectomy in April 2022, parkinson's disease, hyperlipidemia, presenting with 1 week of general malaise, decreased appetite, nausea with dry heaving, This is a 77 year old female with PMH of cholecystectomy in April 2022, parkinson's disease, hyperlipidemia, presenting with 1 week of general malaise, decreased appetite, nausea with dry heaving, subjective fever/chills, upper abdominal pain. Abdominal pain has been on and off since the cholecystectomy but the past week it has been getting worse. Denies any other symptoms such as cough, sob, chest pain, lower extremity swelling.  Additionally, she has a history of L knee replacement 13 years ago and has been noticing swelling and pain of L knee since last week.   Past month she presented to the ED with similar symptoms with unremarkable workup with CT abd/pelvis.

## 2022-10-17 NOTE — H&P ADULT - ASSESSMENT
77 year old female with PMH of cholecystectomy in April 2022, parkinson's disease, hyperlipidemia, presenting with 1 week of general malaise, decreased appetite, nausea with dry heaving, subjective fever/chills, upper abdominal pain. Lab findings non-remarkable,   CT imaging ruled out CBD residual cholelithiasis and obstruction. awaiting MRCP and GI consult  Possible post cholecystectomy syndrome, likely sphincter of oddi dysfunction vs biliary stasis/sludge vs small cholelithiasis in the CBD.     # Epigastric and RUQ abdominal pain  # Nausea and decreased appetite        -

## 2022-10-17 NOTE — ED ADULT NURSE REASSESSMENT NOTE - NS ED NURSE REASSESS COMMENT FT1
Pt ambulated with assistance to bathroom, pt and pt daughter educated that pt needs to remain NPO pnd MRI
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
## 2022-10-17 NOTE — H&P ADULT - NSHPPHYSICALEXAM_GEN_ALL_CORE
T(C): 36.7 (10-17-22 @ 16:16), Max: 36.8 (10-17-22 @ 11:34)  HR: 69 (10-17-22 @ 16:16) (69 - 82)  BP: 112/70 (10-17-22 @ 16:16) (112/70 - 130/75)  RR: 17 (10-17-22 @ 16:16) (16 - 17)  SpO2: 95% (10-17-22 @ 16:16) (95% - 97%)    PHYSICAL EXAM:  GENERAL: NAD, well-developed  HEAD:  Atraumatic, Normocephalic  EYES: EOMI, PERRLA, conjunctiva and sclera clear  NECK: Supple, No JVD  CHEST/LUNG: Clear to auscultation bilaterally; No wheeze  HEART: Regular rate and rhythm; No murmurs, rubs, or gallops  ABDOMEN: Soft, Nontender, Nondistended; Bowel sounds present  EXTREMITIES:  2+ Peripheral Pulses, No clubbing, cyanosis, or edema  PSYCH: AAOx3  NEUROLOGY: non-focal  SKIN: No rashes or lesions

## 2022-10-17 NOTE — ED PROVIDER NOTE - NS ED ROS FT
GENERAL: No fever, no chills  	EYES: No change in vision  	HEENT: No trouble swallowing or speaking  	CARDIAC: No chest pain  	PULMONARY: No cough, no SOB  	GI: + abdominal pain, + nausea, no vomiting, no diarrhea, no constipation  	: No changes in urination  	SKIN: No rashes  	NEURO: No headache, no numbness  	MSK: No visible bony deformity   Otherwise as HPI or negative.

## 2022-10-17 NOTE — ED PROVIDER NOTE - ATTENDING CONTRIBUTION TO CARE
I, Zay Vicente, performed a history and physical exam of the patient and discussed their management with the resident and /or advanced care provider. I reviewed the resident and /or ACP's note and agree with the documented findings and plan of care. I was present and available for all procedures.     77 year old female with PMH of cholecystectomy in April 2022, parkinson's disease, hyperlipidemia, presenting with 1 week of general malaise associate with epigastric pain.  Patient was seen in the ED remarkable for similar symptoms had a work-up that was unremarkable.  Patient received minimal migration or radiation of the pain.  Given presentation with tenderness to palpation on exam we will perform abdominal and cardiac work-up including EKG with CXR and obtain CT abdomen and pelvis.  Please rotation possibly represent aortitis, retained stone and low likelihood of ACS given the persistent symptoms and the work-up roughly a month ago.  Patient also complaining of left knee pain and states that this is chronic and that she had surgery done many years ago and it flares up from time to time.  Potation a concern for DVT will obtain x-ray of the left knee.

## 2022-10-17 NOTE — ED PROVIDER NOTE - PHYSICAL EXAMINATION
General: NAD  HEENT: NCAT  Cardiac: RRR, 2+ radial pulses  Chest: CTA  Abdomen: soft, non-distended, + ttp epigastric, RUQ with + [place] sign. Otherwise, no ttp., no rebound or guarding  Extremities: no peripheral edema, calf tenderness, or leg size discrepancies  L knee: swollen compared to R. Mildly erythematous, and warm to touch.   Skin: no rashes  Neuro: AAOx3, motor and sensory grossly intact  Psych: mood and affect appropriate

## 2022-10-17 NOTE — ED PROVIDER NOTE - CLINICAL SUMMARY MEDICAL DECISION MAKING FREE TEXT BOX
This is a 77 year old female with PMH of cholecystectomy in April 2022, parkinson's disease, hyperlipidemia, presenting with 1 week of general malaise, decreased appetite, nausea with dry heaving, subjective fever/chills, upper abdominal pain. Additionally, has L knee pain and swelling and warmth. Will work up with labs, Ct abd/pevl w/ IV contrast and ultrasound RUQ. Will treat with pepcid, toradol and maalox and reassess.

## 2022-10-17 NOTE — ED ADULT TRIAGE NOTE - WEIGHT IN KG
Nerve block completed at bedside in Pre-Op. Patient connected to all monitoring equipment and oxygen via nasal cannula at 2 liters. Pre procedure telemetry strip posted. Patient medicated with 2 mg IV Versed after timeout completed. Patient monitored continuously throughout procedure.  All safety measures initiated
56.2

## 2022-10-17 NOTE — H&P ADULT - HISTORY OF PRESENT ILLNESS
77 year old female with PMH of cholecystectomy in April 2022, parkinson's disease, hyperlipidemia, presenting with 1 week of general malaise, decreased appetite, nausea with dry heaving, subjective fever/chills, upper abdominal pain. Abdominal pain has been on and off since the cholecystectomy but the past week it has been getting worse. Denies any other symptoms such as cough, sob, chest pain, lower extremity swelling.  	Additionally, she has a history of L knee replacement 13 years ago and has been noticing swelling and pain of L knee since last week.   Past month she presented to the ED with similar symptoms with unremarkable workup with CT abd/pelvis.

## 2022-10-18 LAB
CULTURE RESULTS: SIGNIFICANT CHANGE UP
RAPID RVP RESULT: SIGNIFICANT CHANGE UP
SARS-COV-2 RNA SPEC QL NAA+PROBE: SIGNIFICANT CHANGE UP
SPECIMEN SOURCE: SIGNIFICANT CHANGE UP

## 2022-10-18 PROCEDURE — 74183 MRI ABD W/O CNTR FLWD CNTR: CPT | Mod: 26

## 2022-10-18 RX ORDER — LANOLIN ALCOHOL/MO/W.PET/CERES
3 CREAM (GRAM) TOPICAL AT BEDTIME
Refills: 0 | Status: DISCONTINUED | OUTPATIENT
Start: 2022-10-18 | End: 2022-10-19

## 2022-10-18 RX ADMIN — ENOXAPARIN SODIUM 30 MILLIGRAM(S): 100 INJECTION SUBCUTANEOUS at 05:46

## 2022-10-18 RX ADMIN — CARBIDOPA AND LEVODOPA 1 TABLET(S): 25; 100 TABLET ORAL at 07:47

## 2022-10-18 RX ADMIN — Medication 50 MILLIGRAM(S): at 19:51

## 2022-10-18 RX ADMIN — CARBIDOPA AND LEVODOPA 1 TABLET(S): 25; 100 TABLET ORAL at 21:17

## 2022-10-18 RX ADMIN — ATORVASTATIN CALCIUM 10 MILLIGRAM(S): 80 TABLET, FILM COATED ORAL at 21:17

## 2022-10-18 RX ADMIN — Medication 650 MILLIGRAM(S): at 07:12

## 2022-10-18 RX ADMIN — PANTOPRAZOLE SODIUM 40 MILLIGRAM(S): 20 TABLET, DELAYED RELEASE ORAL at 05:46

## 2022-10-18 NOTE — PATIENT PROFILE ADULT - FOOD INSECURITY
Writer left message for patient to convey results/MD recommendations.  Patient given call back number and name to return call to office at earliest convenience.     no

## 2022-10-18 NOTE — PATIENT PROFILE ADULT - FALL HARM RISK - HARM RISK INTERVENTIONS

## 2022-10-18 NOTE — PROGRESS NOTE ADULT - SUBJECTIVE AND OBJECTIVE BOX
Patient is a 77y old  Female who presents with a chief complaint of     SUBJECTIVE / OVERNIGHT EVENTS:    MEDICATIONS  (STANDING):  aspirin enteric coated 81 milliGRAM(s) Oral daily  atorvastatin 10 milliGRAM(s) Oral at bedtime  carbidopa/levodopa  25/100 1 Tablet(s) Oral three times a day  enoxaparin Injectable 30 milliGRAM(s) SubCutaneous every 24 hours  loratadine 10 milliGRAM(s) Oral daily  pantoprazole    Tablet 40 milliGRAM(s) Oral before breakfast    MEDICATIONS  (PRN):  acetaminophen     Tablet .. 650 milliGRAM(s) Oral every 6 hours PRN Temp greater or equal to 38C (100.4F), Mild Pain (1 - 3)  meclizine 12.5 milliGRAM(s) Oral every 8 hours PRN Dizziness  pregabalin 50 milliGRAM(s) Oral daily PRN pain      Vital Signs Last 24 Hrs  T(F): 98.2 (10-18-22 @ 12:13), Max: 98.6 (10-18-22 @ 10:53)  HR: 97 (10-18-22 @ 12:13) (67 - 97)  BP: 125/78 (10-18-22 @ 12:13) (121/77 - 136/79)  RR: 18 (10-18-22 @ 12:13) (18 - 18)  SpO2: 96% (10-18-22 @ 10:53) (95% - 96%)  Telemetry:   CAPILLARY BLOOD GLUCOSE        I&O's Summary      PHYSICAL EXAM:  GENERAL: NAD, well-developed  HEAD:  Atraumatic, Normocephalic  EYES: EOMI, PERRLA, conjunctiva and sclera clear  NECK: Supple, No JVD  CHEST/LUNG: Clear to auscultation bilaterally; No wheeze  HEART: Regular rate and rhythm; No murmurs, rubs, or gallops  ABDOMEN: Soft, Nontender, Nondistended; Bowel sounds present  EXTREMITIES:  2+ Peripheral Pulses, No clubbing, cyanosis, or edema  PSYCH: AAOx3  NEUROLOGY: non-focal  SKIN: No rashes or lesions    LABS:                        11.4   4.95  )-----------( 194      ( 17 Oct 2022 14:31 )             35.7     10-17    143  |  106  |  15  ----------------------------<  91  3.9   |  26  |  0.60    Ca    9.3      17 Oct 2022 14:31    TPro  6.3  /  Alb  4.1  /  TBili  0.4  /  DBili  x   /  AST  13  /  ALT  6<L>  /  AlkPhos  61  10-17          Urinalysis Basic - ( 17 Oct 2022 16:33 )    Color: Light Yellow / Appearance: Clear / S.009 / pH: x  Gluc: x / Ketone: Small  / Bili: Negative / Urobili: Negative   Blood: x / Protein: Negative / Nitrite: Negative   Leuk Esterase: Negative / RBC: 17 /hpf / WBC 1 /HPF   Sq Epi: x / Non Sq Epi: 0 /hpf / Bacteria: Negative        RADIOLOGY & ADDITIONAL TESTS:    Imaging Personally Reviewed:    Consultant(s) Notes Reviewed:      Care Discussed with Consultants/Other Providers:   Patient is a 77y old  Female who presents with a chief complaint of     SUBJECTIVE / OVERNIGHT EVENTS: ongoing epigastric pain    MEDICATIONS  (STANDING):  aspirin enteric coated 81 milliGRAM(s) Oral daily  atorvastatin 10 milliGRAM(s) Oral at bedtime  carbidopa/levodopa  25/100 1 Tablet(s) Oral three times a day  enoxaparin Injectable 30 milliGRAM(s) SubCutaneous every 24 hours  loratadine 10 milliGRAM(s) Oral daily  pantoprazole    Tablet 40 milliGRAM(s) Oral before breakfast    MEDICATIONS  (PRN):  acetaminophen     Tablet .. 650 milliGRAM(s) Oral every 6 hours PRN Temp greater or equal to 38C (100.4F), Mild Pain (1 - 3)  meclizine 12.5 milliGRAM(s) Oral every 8 hours PRN Dizziness  pregabalin 50 milliGRAM(s) Oral daily PRN pain      Vital Signs Last 24 Hrs  T(F): 98.2 (10-18-22 @ 12:13), Max: 98.6 (10-18-22 @ 10:53)  HR: 97 (10-18-22 @ 12:13) (67 - 97)  BP: 125/78 (10-18-22 @ 12:13) (121/77 - 136/79)  RR: 18 (10-18-22 @ 12:13) (18 - 18)  SpO2: 96% (10-18-22 @ 10:53) (95% - 96%)  Telemetry:   CAPILLARY BLOOD GLUCOSE        I&O's Summary      PHYSICAL EXAM:  GENERAL: NAD, well-developed  HEAD:  Atraumatic, Normocephalic  EYES: EOMI, PERRLA, conjunctiva and sclera clear  NECK: Supple, No JVD  CHEST/LUNG: Clear to auscultation bilaterally; No wheeze  HEART: Regular rate and rhythm; No murmurs, rubs, or gallops  ABDOMEN: Soft, Nontender, Nondistended; Bowel sounds present  EXTREMITIES:  2+ Peripheral Pulses, No clubbing, cyanosis, or edema  PSYCH: AAOx3  NEUROLOGY: non-focal  SKIN: No rashes or lesions    LABS:                        11.4   4.95  )-----------( 194      ( 17 Oct 2022 14:31 )             35.7     10-    143  |  106  |  15  ----------------------------<  91  3.9   |  26  |  0.60    Ca    9.3      17 Oct 2022 14:31    TPro  6.3  /  Alb  4.1  /  TBili  0.4  /  DBili  x   /  AST  13  /  ALT  6<L>  /  AlkPhos  61  10-17          Urinalysis Basic - ( 17 Oct 2022 16:33 )    Color: Light Yellow / Appearance: Clear / S.009 / pH: x  Gluc: x / Ketone: Small  / Bili: Negative / Urobili: Negative   Blood: x / Protein: Negative / Nitrite: Negative   Leuk Esterase: Negative / RBC: 17 /hpf / WBC 1 /HPF   Sq Epi: x / Non Sq Epi: 0 /hpf / Bacteria: Negative        RADIOLOGY & ADDITIONAL TESTS:    Imaging Personally Reviewed:    Consultant(s) Notes Reviewed:      Care Discussed with Consultants/Other Providers:

## 2022-10-18 NOTE — PATIENT PROFILE ADULT - NSPROEXTENSIONSOFSELF_GEN_A_NUR
A/P 28y s/p , PPD #1, stable, meeting postpartum milestones   - Pain: well controlled on tylenol and motrin  - GI: Tolerating regular diet, colace PRN  - : urinating without difficulty/pain  -DVT prophylaxis: ambulating frequently  -Dispo: PPD 2, unless otherwise specified cane

## 2022-10-19 ENCOUNTER — TRANSCRIPTION ENCOUNTER (OUTPATIENT)
Age: 77
End: 2022-10-19

## 2022-10-19 VITALS
DIASTOLIC BLOOD PRESSURE: 79 MMHG | SYSTOLIC BLOOD PRESSURE: 118 MMHG | HEART RATE: 75 BPM | OXYGEN SATURATION: 96 % | RESPIRATION RATE: 17 BRPM | TEMPERATURE: 99 F

## 2022-10-19 PROCEDURE — 99223 1ST HOSP IP/OBS HIGH 75: CPT | Mod: GC

## 2022-10-19 RX ORDER — POLYETHYLENE GLYCOL 3350 17 G/17G
17 POWDER, FOR SOLUTION ORAL
Refills: 0 | Status: DISCONTINUED | OUTPATIENT
Start: 2022-10-19 | End: 2022-10-19

## 2022-10-19 RX ORDER — ACETAMINOPHEN 500 MG
2 TABLET ORAL
Qty: 0 | Refills: 0 | DISCHARGE

## 2022-10-19 RX ORDER — CARBIDOPA 25 MG
1 TABLET ORAL
Qty: 0 | Refills: 0 | DISCHARGE

## 2022-10-19 RX ORDER — POLYETHYLENE GLYCOL 3350 17 G/17G
17 POWDER, FOR SOLUTION ORAL
Qty: 1020 | Refills: 0
Start: 2022-10-19 | End: 2022-11-17

## 2022-10-19 RX ADMIN — Medication 650 MILLIGRAM(S): at 11:26

## 2022-10-19 RX ADMIN — CARBIDOPA AND LEVODOPA 1 TABLET(S): 25; 100 TABLET ORAL at 12:45

## 2022-10-19 RX ADMIN — Medication 81 MILLIGRAM(S): at 12:44

## 2022-10-19 RX ADMIN — PANTOPRAZOLE SODIUM 40 MILLIGRAM(S): 20 TABLET, DELAYED RELEASE ORAL at 05:34

## 2022-10-19 RX ADMIN — CARBIDOPA AND LEVODOPA 1 TABLET(S): 25; 100 TABLET ORAL at 05:34

## 2022-10-19 RX ADMIN — Medication 650 MILLIGRAM(S): at 10:26

## 2022-10-19 RX ADMIN — ENOXAPARIN SODIUM 30 MILLIGRAM(S): 100 INJECTION SUBCUTANEOUS at 05:33

## 2022-10-19 NOTE — CONSULT NOTE ADULT - ATTENDING COMMENTS
Agree with above. Patient has had chronic abdominal pain now since end of last year - more discomfort than pain, worse after eating and relieved after bowel movements. EGD this year prior to cholecystectomy only showed gastritis. Imaging including CT with contrast/MRCP shows no biliary obstruction, normal vasculature, no bowel obstruction. Given chronic nature of pain, suspect possibly related to her constipation, vs possible gastroparesis. Would start Miralax daily and treat constipation - if discomfort still persists, obtain gastric emptying study which can be done as outpatient.    These recommendations were discussed with daughter at bedside and patient, pt wants to go home and have workup as outpatient as she is tolerating PO, which is reasonable. Can follow-up with primary gastroenterologist as outpatient, but would discharge on daily Miralax standing.

## 2022-10-19 NOTE — CONSULT NOTE ADULT - SUBJECTIVE AND OBJECTIVE BOX
77 year old female with PMH of cholecystectomy in 2022, parkinson's disease, hyperlipidemia, presenting with 1 week of general malaise, decreased appetite, nausea with dry heaving, subjective fever/chills, upper abdominal pain. Abdominal pain has been on and off since the cholecystectomy but the past week it has been getting worse. It has been associated with eating, especially spicy food, and alleviated with avoiding eating. Recently start OTC anti-dyspepsia medication without significant improvement. Had upper endoscopy exam done this March without significant findings, except some inflammatory changes. Denies any other symptoms such as cough, sob, chest pain, lower extremity swelling.     Pt vss, no leukocytosis, lipase wnl, imaging negative for residual CBD stone per CT and MRI. GI was consult for RUQ and mid gastric abdominal pain with nausea/malaise and IPMN pancreatic findings per MRI.       MEDICATIONS  (STANDING):  aspirin enteric coated 81 milliGRAM(s) Oral daily  atorvastatin 10 milliGRAM(s) Oral at bedtime  carbidopa/levodopa  25/100 1 Tablet(s) Oral three times a day  enoxaparin Injectable 30 milliGRAM(s) SubCutaneous every 24 hours  loratadine 10 milliGRAM(s) Oral daily  melatonin 3 milliGRAM(s) Oral at bedtime  pantoprazole    Tablet 40 milliGRAM(s) Oral before breakfast  polyethylene glycol 3350 17 Gram(s) Oral two times a day    MEDICATIONS  (PRN):  acetaminophen     Tablet .. 650 milliGRAM(s) Oral every 6 hours PRN Temp greater or equal to 38C (100.4F), Mild Pain (1 - 3)  meclizine 12.5 milliGRAM(s) Oral every 8 hours PRN Dizziness  pregabalin 50 milliGRAM(s) Oral daily PRN pain      CAPILLARY BLOOD GLUCOSE        I&O's Summary    18 Oct 2022 07:01  -  19 Oct 2022 07:00  --------------------------------------------------------  IN: 240 mL / OUT: 0 mL / NET: 240 mL    19 Oct 2022 07:01  -  19 Oct 2022 14:04  --------------------------------------------------------  IN: 420 mL / OUT: 0 mL / NET: 420 mL        PHYSICAL EXAM:  Vital Signs Last 24 Hrs  T(C): 37 (19 Oct 2022 12:31), Max: 37 (19 Oct 2022 12:31)  T(F): 98.6 (19 Oct 2022 12:31), Max: 98.6 (19 Oct 2022 12:31)  HR: 63 (19 Oct 2022 12:31) (63 - 69)  BP: 138/76 (19 Oct 2022 12:31) (138/76 - 144/78)  BP(mean): --  RR: 18 (19 Oct 2022 12:31) (17 - 18)  SpO2: 96% (19 Oct 2022 12:31) (95% - 96%)    Parameters below as of 19 Oct 2022 12:31  Patient On (Oxygen Delivery Method): room air        GENERAL: No acute distress, well-developed  HEAD:  Atraumatic, Normocephalic  EYES: EOMI, PERRLA, conjunctiva and sclera clear  NECK: Supple, no lymphadenopathy, no JVD  CHEST/LUNG: CTAB; No wheezes, rales, or rhonchi  HEART: Regular rate and rhythm; No murmurs, rubs, or gallops  ABDOMEN: Soft, mild epigastric pain, non-distended; normal bowel sounds, no organomegaly  EXTREMITIES:  2+ peripheral pulses b/l, No clubbing, cyanosis, or edema  NEUROLOGY: A&O x 3, no focal deficits  SKIN: No rashes or lesions    LABS:                        11.4   4.95  )-----------( 194      ( 17 Oct 2022 14:31 )             35.7     10-17    143  |  106  |  15  ----------------------------<  91  3.9   |  26  |  0.60    Ca    9.3      17 Oct 2022 14:31    TPro  6.3  /  Alb  4.1  /  TBili  0.4  /  DBili  x   /  AST  13  /  ALT  6<L>  /  AlkPhos  61  10-17          Urinalysis Basic - ( 17 Oct 2022 16:33 )    Color: Light Yellow / Appearance: Clear / S.009 / pH: x  Gluc: x / Ketone: Small  / Bili: Negative / Urobili: Negative   Blood: x / Protein: Negative / Nitrite: Negative   Leuk Esterase: Negative / RBC: 17 /hpf / WBC 1 /HPF   Sq Epi: x / Non Sq Epi: 0 /hpf / Bacteria: Negative        Culture - Urine (collected 17 Oct 2022 16:33)  Source: Clean Catch Clean Catch (Midstream)  Final Report (18 Oct 2022 18:32):    <10,000 CFU/mL Normal Urogenital Anila        RADIOLOGY & ADDITIONAL TESTS:  Results Reviewed:     < from: US Abdomen Upper Quadrant Right (10.17.22 @ 16:08) >  FINDINGS:  Liver: Mild intrahepatic biliary ductal dilation. The hypodense lesion   seen within the liver on CT examination 2022 is not appreciated on   this examination. Liver contour is smooth.  Bile ducts: Mild intrahepatic biliary ductal dilatation. Common bile duct   measures 11 mm.  Gallbladder: Cholecystectomy.  Pancreas: Some degree of fatty replacement. The pancreatic duct does not   appear dilated.  Right kidney: 10.6 cm. No hydronephrosis.  Ascites: None.  IVC: Visualized portions are within normal limits.    IMPRESSION:    Mild intrahepatic biliary ductal dilation with dilated common bile duct   measuring 11 mm. This appearance likely is related topatient's   cholecystectomy.  This appearance was seen on CT examination 2022 and can also be   correlated with CT abdomen and pelvis examination to be performed.    < end of copied text >    < from: CT Abdomen and Pelvis w/ IV Cont (10.17.22 @ 16:54) >  IMPRESSION:  Postcholecystectomy. There is mild increased dilatation of the CBD, now   measuring up to 1.4 cm in diameter, when compared to 2022. Given the   change since the prior exam, consider further evaluation with MRCP to   exclude choledocholithiasis.      < end of copied text >    < from: MR MRCP w/wo IV Cont (10.18.22 @ 19:29) >  IMPRESSION:  1.  Mild biliary ductal dilatation which may be related to   postcholecystectomy state. No choledocholithiasis.  2.  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      < end of copied text >      Imaging Personally Reviewed:  Electrocardiogram Personally Reviewed:    COORDINATION OF CARE:  Care Discussed with Consultants/Other Providers [Y/N]:  Prior or Outpatient Records Reviewed [Y/N]:   77 year old female with PMH of cholecystectomy in 2022, parkinson's disease, hyperlipidemia, chronic constipation (once in 3 days), presenting with 1 week of general malaise, decreased appetite, nausea with dry heaving, subjective fever/chills, upper abdominal pain/discomfort. Per pt, she has been mainly bothered by abdominal pain/discomfort and slow stomach emptying. Symptoms have been on and off before cholecystectomy and then still persisted after gallbladder removal. Over the past week it has been getting worse. It has been mostly associated with eating, especially spicy food, and alleviated with avoiding eating. Improved by bowl movement. Recently start OTC anti-dyspepsia medication without significant improvement. Had upper endoscopy exam done this March without significant findings, except some inflammatory changes. Denies any other symptoms such as cough, sob, chest pain, lower extremity swelling. Colonoscopy nonremarkable done 8 years ago. EGD non-remarkable done 2022.     Pt vss, no leukocytosis, lipase wnl, imaging negative for residual CBD stone per CT and MRI, no vascular compromise per CT. GI was consult for abdominal pain with nausea/malaise and IPMN pancreatic findings per MRI.       MEDICATIONS  (STANDING):  aspirin enteric coated 81 milliGRAM(s) Oral daily  atorvastatin 10 milliGRAM(s) Oral at bedtime  carbidopa/levodopa  25/100 1 Tablet(s) Oral three times a day  enoxaparin Injectable 30 milliGRAM(s) SubCutaneous every 24 hours  loratadine 10 milliGRAM(s) Oral daily  melatonin 3 milliGRAM(s) Oral at bedtime  pantoprazole    Tablet 40 milliGRAM(s) Oral before breakfast  polyethylene glycol 3350 17 Gram(s) Oral two times a day    MEDICATIONS  (PRN):  acetaminophen     Tablet .. 650 milliGRAM(s) Oral every 6 hours PRN Temp greater or equal to 38C (100.4F), Mild Pain (1 - 3)  meclizine 12.5 milliGRAM(s) Oral every 8 hours PRN Dizziness  pregabalin 50 milliGRAM(s) Oral daily PRN pain      CAPILLARY BLOOD GLUCOSE        I&O's Summary    18 Oct 2022 07:01  -  19 Oct 2022 07:00  --------------------------------------------------------  IN: 240 mL / OUT: 0 mL / NET: 240 mL    19 Oct 2022 07:01  -  19 Oct 2022 14:04  --------------------------------------------------------  IN: 420 mL / OUT: 0 mL / NET: 420 mL        PHYSICAL EXAM:  Vital Signs Last 24 Hrs  T(C): 37 (19 Oct 2022 12:31), Max: 37 (19 Oct 2022 12:31)  T(F): 98.6 (19 Oct 2022 12:), Max: 98.6 (19 Oct 2022 12:31)  HR: 63 (19 Oct 2022 12:) (63 - 69)  BP: 138/76 (19 Oct 2022 12:) (138/76 - 144/78)  BP(mean): --  RR: 18 (19 Oct 2022 12:31) (17 - 18)  SpO2: 96% (19 Oct 2022 12:31) (95% - 96%)    Parameters below as of 19 Oct 2022 12:31  Patient On (Oxygen Delivery Method): room air        GENERAL: No acute distress, well-developed  HEAD:  Atraumatic, Normocephalic  EYES: EOMI, PERRLA, conjunctiva and sclera clear  NECK: Supple, no lymphadenopathy, no JVD  CHEST/LUNG: CTAB; No wheezes, rales, or rhonchi  HEART: Regular rate and rhythm; No murmurs, rubs, or gallops  ABDOMEN: Soft, mild epigastric pain, non-distended; normal bowel sounds, no organomegaly  EXTREMITIES:  2+ peripheral pulses b/l, No clubbing, cyanosis, or edema  NEUROLOGY: A&O x 3, no focal deficits  SKIN: No rashes or lesions    LABS:                        11.4   4.95  )-----------( 194      ( 17 Oct 2022 14:31 )             35.7     10-17    143  |  106  |  15  ----------------------------<  91  3.9   |  26  |  0.60    Ca    9.3      17 Oct 2022 14:31    TPro  6.3  /  Alb  4.1  /  TBili  0.4  /  DBili  x   /  AST  13  /  ALT  6<L>  /  AlkPhos  61  10-17          Urinalysis Basic - ( 17 Oct 2022 16:33 )    Color: Light Yellow / Appearance: Clear / S.009 / pH: x  Gluc: x / Ketone: Small  / Bili: Negative / Urobili: Negative   Blood: x / Protein: Negative / Nitrite: Negative   Leuk Esterase: Negative / RBC: 17 /hpf / WBC 1 /HPF   Sq Epi: x / Non Sq Epi: 0 /hpf / Bacteria: Negative        Culture - Urine (collected 17 Oct 2022 16:33)  Source: Clean Catch Clean Catch (Midstream)  Final Report (18 Oct 2022 18:32):    <10,000 CFU/mL Normal Urogenital Anila        RADIOLOGY & ADDITIONAL TESTS:  Results Reviewed:     < from: US Abdomen Upper Quadrant Right (10.17.22 @ 16:08) >  FINDINGS:  Liver: Mild intrahepatic biliary ductal dilation. The hypodense lesion   seen within the liver on CT examination 2022 is not appreciated on   this examination. Liver contour is smooth.  Bile ducts: Mild intrahepatic biliary ductal dilatation. Common bile duct   measures 11 mm.  Gallbladder: Cholecystectomy.  Pancreas: Some degree of fatty replacement. The pancreatic duct does not   appear dilated.  Right kidney: 10.6 cm. No hydronephrosis.  Ascites: None.  IVC: Visualized portions are within normal limits.    IMPRESSION:    Mild intrahepatic biliary ductal dilation with dilated common bile duct   measuring 11 mm. This appearance likely is related topatient's   cholecystectomy.  This appearance was seen on CT examination 2022 and can also be   correlated with CT abdomen and pelvis examination to be performed.    < end of copied text >    < from: CT Abdomen and Pelvis w/ IV Cont (10.17.22 @ 16:54) >  IMPRESSION:  Postcholecystectomy. There is mild increased dilatation of the CBD, now   measuring up to 1.4 cm in diameter, when compared to 2022. Given the   change since the prior exam, consider further evaluation with MRCP to   exclude choledocholithiasis.      < end of copied text >    < from: MR MRCP w/wo IV Cont (10.18.22 @ 19:29) >  IMPRESSION:  1.  Mild biliary ductal dilatation which may be related to   postcholecystectomy state. No choledocholithiasis.  2.  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      < end of copied text >      Imaging Personally Reviewed:  Electrocardiogram Personally Reviewed:    COORDINATION OF CARE:  Care Discussed with Consultants/Other Providers [Y/N]:  Prior or Outpatient Records Reviewed [Y/N]:   77 year old female with PMH of cholecystectomy in 2022, parkinson's disease, hyperlipidemia, chronic constipation (once in 3 days), presenting with 1 week of general malaise, decreased appetite, nausea with dry heaving, subjective fever/chills, upper abdominal pain/discomfort. Per pt, she has been mainly bothered by abdominal pain/discomfort and slow stomach emptying characterized by distension. Symptoms have been on and off before cholecystectomy and then still persisted after gallbladder removal. Discomfort first started 2-3 months before cholecytsectomy. Over the past week it has been getting worse. It has been mostly associated with eating, especially spicy food, and alleviated with avoiding eating. Improved by bowl movement. Recently start OTC anti-dyspepsia medication without significant improvement. Had upper endoscopy exam done this March without significant findings, except some inflammatory changes. Denies any other symptoms such as cough, sob, chest pain, lower extremity swelling. Colonoscopy nonremarkable done 8 years ago. EGD non-remarkable other than gastritis done 2022 (report in [employer]). Reports chronic constipation - has a BM q 3 days, and reports improvement of abdominal discomfort after bowel movements. No nausea, reports only 1 episode of vomiting recently. No dysphagia. Has been diagnosed with Parkinson's for 13 years with no significant changes in medications.    Pt vss, no leukocytosis, lipase wnl, imaging negative for residual CBD stone per CT and MRI, no vascular compromise per CT. GI was consult for abdominal pain with nausea/malaise and IPMN pancreatic findings per MRI.       MEDICATIONS  (STANDING):  aspirin enteric coated 81 milliGRAM(s) Oral daily  atorvastatin 10 milliGRAM(s) Oral at bedtime  carbidopa/levodopa  25/100 1 Tablet(s) Oral three times a day  enoxaparin Injectable 30 milliGRAM(s) SubCutaneous every 24 hours  loratadine 10 milliGRAM(s) Oral daily  melatonin 3 milliGRAM(s) Oral at bedtime  pantoprazole    Tablet 40 milliGRAM(s) Oral before breakfast  polyethylene glycol 3350 17 Gram(s) Oral two times a day    MEDICATIONS  (PRN):  acetaminophen     Tablet .. 650 milliGRAM(s) Oral every 6 hours PRN Temp greater or equal to 38C (100.4F), Mild Pain (1 - 3)  meclizine 12.5 milliGRAM(s) Oral every 8 hours PRN Dizziness  pregabalin 50 milliGRAM(s) Oral daily PRN pain      CAPILLARY BLOOD GLUCOSE        I&O's Summary    18 Oct 2022 07:01  -  19 Oct 2022 07:00  --------------------------------------------------------  IN: 240 mL / OUT: 0 mL / NET: 240 mL    19 Oct 2022 07:01  -  19 Oct 2022 14:04  --------------------------------------------------------  IN: 420 mL / OUT: 0 mL / NET: 420 mL        PHYSICAL EXAM:  Vital Signs Last 24 Hrs  T(C): 37 (19 Oct 2022 12:31), Max: 37 (19 Oct 2022 12:31)  T(F): 98.6 (19 Oct 2022 12:31), Max: 98.6 (19 Oct 2022 12:31)  HR: 63 (19 Oct 2022 12:31) (63 - 69)  BP: 138/76 (19 Oct 2022 12:31) (138/76 - 144/78)  BP(mean): --  RR: 18 (19 Oct 2022 12:31) (17 - 18)  SpO2: 96% (19 Oct 2022 12:31) (95% - 96%)    Parameters below as of 19 Oct 2022 12:31  Patient On (Oxygen Delivery Method): room air        GENERAL: No acute distress, well-developed  HEAD:  Atraumatic, Normocephalic  EYES: EOMI, PERRLA, conjunctiva and sclera clear  NECK: Supple, no lymphadenopathy, no JVD  CHEST/LUNG: CTAB; No wheezes, rales, or rhonchi  HEART: Regular rate and rhythm; No murmurs, rubs, or gallops  ABDOMEN: Soft, mild epigastric pain, non-distended; normal bowel sounds, no organomegaly  EXTREMITIES:  2+ peripheral pulses b/l, No clubbing, cyanosis, or edema  NEUROLOGY: A&O x 3, no focal deficits  SKIN: No rashes or lesions  PSYCH: normal affect    LABS:                        11.4   4.95  )-----------( 194      ( 17 Oct 2022 14:31 )             35.7     10    143  |  106  |  15  ----------------------------<  91  3.9   |  26  |  0.60    Ca    9.3      17 Oct 2022 14:31    TPro  6.3  /  Alb  4.1  /  TBili  0.4  /  DBili  x   /  AST  13  /  ALT  6<L>  /  AlkPhos  61  10          Urinalysis Basic - ( 17 Oct 2022 16:33 )    Color: Light Yellow / Appearance: Clear / S.009 / pH: x  Gluc: x / Ketone: Small  / Bili: Negative / Urobili: Negative   Blood: x / Protein: Negative / Nitrite: Negative   Leuk Esterase: Negative / RBC: 17 /hpf / WBC 1 /HPF   Sq Epi: x / Non Sq Epi: 0 /hpf / Bacteria: Negative        Culture - Urine (collected 17 Oct 2022 16:33)  Source: Clean Catch Clean Catch (Midstream)  Final Report (18 Oct 2022 18:32):    <10,000 CFU/mL Normal Urogenital Anila        RADIOLOGY & ADDITIONAL TESTS:  Results Reviewed:     < from: US Abdomen Upper Quadrant Right (10.17.22 @ 16:08) >  FINDINGS:  Liver: Mild intrahepatic biliary ductal dilation. The hypodense lesion   seen within the liver on CT examination 2022 is not appreciated on   this examination. Liver contour is smooth.  Bile ducts: Mild intrahepatic biliary ductal dilatation. Common bile duct   measures 11 mm.  Gallbladder: Cholecystectomy.  Pancreas: Some degree of fatty replacement. The pancreatic duct does not   appear dilated.  Right kidney: 10.6 cm. No hydronephrosis.  Ascites: None.  IVC: Visualized portions are within normal limits.    IMPRESSION:    Mild intrahepatic biliary ductal dilation with dilated common bile duct   measuring 11 mm. This appearance likely is related topatient's   cholecystectomy.  This appearance was seen on CT examination 2022 and can also be   correlated with CT abdomen and pelvis examination to be performed.    < end of copied text >    < from: CT Abdomen and Pelvis w/ IV Cont (10.17.22 @ 16:54) >  IMPRESSION:  Postcholecystectomy. There is mild increased dilatation of the CBD, now   measuring up to 1.4 cm in diameter, when compared to 2022. Given the   change since the prior exam, consider further evaluation with MRCP to   exclude choledocholithiasis.      < end of copied text >    < from: MR MRCP w/wo IV Cont (10.18.22 @ 19:29) >  IMPRESSION:  1.  Mild biliary ductal dilatation which may be related to   postcholecystectomy state. No choledocholithiasis.  2.  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      < end of copied text >      Imaging Personally Reviewed:  Electrocardiogram Personally Reviewed:    COORDINATION OF CARE:  Care Discussed with Consultants/Other Providers [Y/N]:  Prior or Outpatient Records Reviewed [Y/N]:

## 2022-10-19 NOTE — CONSULT NOTE ADULT - ASSESSMENT
77 year old female with PMH of cholecystectomy in April 2022, parkinson's disease, hyperlipidemia, presenting with 1 week of general malaise, decreased appetite, nausea with dry heaving, subjective fever/chills, upper abdominal pain. Lab findings non-remarkable, CT and MR imaging ruled out CBD residual cholelithiasis and obstruction. Favoring post cholecystectomy syndrome, likely sphincter of oddi dysfunction vs biliary stasis/sludge vs small cholelithiasis in the CBD.     # Epigastric and RUQ abdominal pain  # Nausea and decreased appetite    Plans:    -  77 year old female with PMH of cholecystectomy in April 2022, parkinson's disease, hyperlipidemia, presenting with 1 week worsening general malaise, decreased appetite, nausea with dry heaving, subjective fever/chills, upper abdominal pain. Symptoms have been existed before cholecystectomy and persisted as now. Lab findings non-remarkable (no LFT and bilirubin elevation), CT and MR imaging ruled out CBD residual stone and obstruction, CT IV contrast ruled out mesentery ischemic change. IMPN findings per MRI.      # Epigastric abdominal pain: exacerbated by eating, alleviated by stool passing. Recent EGD non-remarkable and colonoscopy <10 years negative.  Low likelihood of hepatobiliary stasis or obstruction, upper GI tract malignancy/mass/obstruction. Favoring constipation, gastroparesis vs other idiopathic mechanisms.  # Nausea and decreased appetite: likely 2/2 constipation vs gastroparesis vs other idiopathic mechanisms.  # Pancreatic IPMN: incidental findings and asymptomatic. Unclear etiology.     Plans:    - Start Miralax bowel regimen to relieve constipation  - Can discharge home if PO and pain/discomfort tolerated  - Recommend smaller and frequent meals   - Outpatient gastric emptying study as needed.       Note is not finalized until attending signs it.  77 year old female with PMH of cholecystectomy in April 2022, parkinson's disease, hyperlipidemia, presenting with 1 week worsening general malaise, decreased appetite, nausea with dry heaving, subjective fever/chills, upper abdominal pain. Symptoms have been existed before cholecystectomy and persisted as now. Lab findings non-remarkable (no LFT and bilirubin elevation), CT and MR imaging ruled out CBD residual stone and obstruction, CT IV contrast ruled out mesentery ischemic change. IPMN findings per MRI.      # Epigastric abdominal pain: exacerbated by eating, alleviated by stool passing. Recent EGD non-remarkable and colonoscopy <10 years negative.  Low likelihood of hepatobiliary stasis or obstruction, upper GI tract malignancy/mass/obstruction. Favoring constipation, gastroparesis vs other idiopathic mechanisms.  # Nausea and decreased appetite: likely 2/2 constipation vs gastroparesis vs other idiopathic mechanisms.  # Pancreatic IPMN: incidental findings and asymptomatic. Unclear etiology.     Plans:    - Start Miralax bowel regimen to relieve constipation  - Can discharge home if PO and pain/discomfort tolerated  - Recommend smaller and frequent meals   - Outpatient gastric emptying study as needed.     - Outpatient follow up for pancreatic findings    Note is not finalized until attending signs it.  77 year old female with PMH of cholecystectomy in April 2022, parkinson's disease, hyperlipidemia, presenting with 1 week worsening general malaise, decreased appetite, nausea with dry heaving, subjective fever/chills, upper abdominal pain. Symptoms have been existed before cholecystectomy and persisted as now. Lab findings non-remarkable (no LFT and bilirubin elevation), CT and MR imaging ruled out CBD residual stone and obstruction, CT IV contrast ruled out mesentery ischemic change. IPMN findings per MRI.      # Epigastric abdominal pain: exacerbated by eating, alleviated by stool passing. Recent EGD non-remarkable and colonoscopy <10 years negative.  Low likelihood of hepatobiliary stasis or obstruction, upper GI tract malignancy/mass/obstruction. Favoring constipation, gastroparesis vs other idiopathic mechanisms.  # Nausea and decreased appetite: likely 2/2 constipation vs gastroparesis vs other idiopathic mechanisms.  # Pancreatic IPMN: incidental findings and asymptomatic.     Plans:    - Start Miralax bowel regimen daily to relieve constipation  - Can discharge home if PO and pain/discomfort tolerated  - Recommend smaller and frequent meals   - Outpatient gastric emptying study as needed as patient does not want to undergo inpatient workup  - Outpatient follow up for pancreatic findings    Note is not finalized until attending signs it.

## 2022-10-19 NOTE — DISCHARGE NOTE PROVIDER - NSDCMRMEDTOKEN_GEN_ALL_CORE_FT
acetaminophen 500 mg oral tablet: 2 tab(s) orally , As Needed  aspirin 81 mg oral delayed release tablet: 1 tab(s) orally once a day  atorvastatin 10 mg oral tablet: 1 tab(s) orally once a day  carbidopa 25 mg oral tablet: 1 tab(s) orally 3 times a day  carbidopa-levodopa 25 mg-100 mg oral tablet: 1 tab(s) orally 3 times a day  esomeprazole 40 mg oral delayed release capsule: 1 cap(s) orally once a day  loratadine 10 mg oral tablet: 1 tab(s) orally once a day  meclizine 12.5 mg oral tablet: 1 tab(s) orally , As Needed  pregabalin 50 mg oral capsule: 1 cap(s) orally once a day, As Needed.    NOTE: Pharmacy last dispensed January 2022 for 30 days supply  Restasis 0.05% ophthalmic emulsion: 1 drop(s) to each affected eye every 12 hours  Vascepa 1 g oral capsule: 1 cap(s) orally once a day   aspirin 81 mg oral delayed release tablet: 1 tab(s) orally once a day  atorvastatin 10 mg oral tablet: 1 tab(s) orally once a day  carbidopa-levodopa 25 mg-100 mg oral tablet: 1 tab(s) orally 3 times a day  esomeprazole 40 mg oral delayed release capsule: 1 cap(s) orally once a day  loratadine 10 mg oral tablet: 1 tab(s) orally once a day  meclizine 12.5 mg oral tablet: 1 tab(s) orally , As Needed  polyethylene glycol 3350 oral powder for reconstitution: 17 gram(s) orally 2 times a day  pregabalin 50 mg oral capsule: 1 cap(s) orally once a day, As Needed.    NOTE: Pharmacy last dispensed January 2022 for 30 days supply  Restasis 0.05% ophthalmic emulsion: 1 drop(s) to each affected eye every 12 hours  Vascepa 1 g oral capsule: 1 cap(s) orally once a day

## 2022-10-19 NOTE — DISCHARGE NOTE PROVIDER - NSDCCPCAREPLAN_GEN_ALL_CORE_FT
PRINCIPAL DISCHARGE DIAGNOSIS  Diagnosis: Abdominal pain  Assessment and Plan of Treatment: Please follow up with GI doctor. You are able to eat a regular diet, as tolerated. But are encouraged to eat small, frequent meals.      SECONDARY DISCHARGE DIAGNOSES  Diagnosis: S/P cholecystectomy  Assessment and Plan of Treatment:

## 2022-10-19 NOTE — DISCHARGE NOTE NURSING/CASE MANAGEMENT/SOCIAL WORK - NSDCPEFALRISK_GEN_ALL_CORE
For information on Fall & Injury Prevention, visit: [email or web address] OR  [email or web address] OR  https://www.cdc.gov/steadi/patient.html

## 2022-10-19 NOTE — DISCHARGE NOTE PROVIDER - NSDCFUADDINST_GEN_ALL_CORE_FT
Please follow up with your Gastroenterologist for GI emptying studies. If these studies are unable to be done at your private GI doctor, then please follow up with Dr. Wilkerson

## 2022-10-19 NOTE — DISCHARGE NOTE PROVIDER - CARE PROVIDER_API CALL
James Wilkerson)  Gastroenterology; Internal Medicine  [address] [place] [address]  Phone: [phone]  Fax: [phone]  Follow Up Time:

## 2022-10-19 NOTE — DISCHARGE NOTE PROVIDER - REASON FOR ADMISSION
Received message from Dr Arellano he received a call overnight patient had 3.5 sec pause. Received strips from [employer] Reviewed with Dr Arellano patient has appointment in CHF clinic today and will likely be admitted.   Abdominal Pain, RUQ/Epigastric pain

## 2022-10-19 NOTE — PROGRESS NOTE ADULT - SUBJECTIVE AND OBJECTIVE BOX
Patient is a 77y old  Female who presents with a chief complaint of Abdominal Pain, RUQ/Epigastric pain (19 Oct 2022 10:21)      SUBJECTIVE / OVERNIGHT EVENTS: ongoing abd pain, CBD dilatation confirmed on MRCP, awaiting GI consult    MEDICATIONS  (STANDING):  aspirin enteric coated 81 milliGRAM(s) Oral daily  atorvastatin 10 milliGRAM(s) Oral at bedtime  carbidopa/levodopa  25/100 1 Tablet(s) Oral three times a day  enoxaparin Injectable 30 milliGRAM(s) SubCutaneous every 24 hours  loratadine 10 milliGRAM(s) Oral daily  melatonin 3 milliGRAM(s) Oral at bedtime  pantoprazole    Tablet 40 milliGRAM(s) Oral before breakfast  polyethylene glycol 3350 17 Gram(s) Oral two times a day    MEDICATIONS  (PRN):  acetaminophen     Tablet .. 650 milliGRAM(s) Oral every 6 hours PRN Temp greater or equal to 38C (100.4F), Mild Pain (1 - 3)  meclizine 12.5 milliGRAM(s) Oral every 8 hours PRN Dizziness  pregabalin 50 milliGRAM(s) Oral daily PRN pain      Vital Signs Last 24 Hrs  T(F): 98.6 (10-19-22 @ 12:31), Max: 98.6 (10-19-22 @ 12:31)  HR: 63 (10-19-22 @ 12:31) (63 - 69)  BP: 138/76 (10-19-22 @ 12:31) (138/76 - 144/78)  RR: 18 (10-19-22 @ 12:31) (17 - 18)  SpO2: 96% (10-19-22 @ 12:31) (95% - 96%)  Telemetry:   CAPILLARY BLOOD GLUCOSE        I&O's Summary    18 Oct 2022 07:  -  19 Oct 2022 07:00  --------------------------------------------------------  IN: 240 mL / OUT: 0 mL / NET: 240 mL    19 Oct 2022 07:  -  19 Oct 2022 15:14  --------------------------------------------------------  IN: 420 mL / OUT: 0 mL / NET: 420 mL        PHYSICAL EXAM:  GENERAL: NAD, well-developed  HEAD:  Atraumatic, Normocephalic  EYES: EOMI, PERRLA, conjunctiva and sclera clear  NECK: Supple, No JVD  CHEST/LUNG: Clear to auscultation bilaterally; No wheeze  HEART: Regular rate and rhythm; No murmurs, rubs, or gallops  ABDOMEN: Soft, Nontender, Nondistended; Bowel sounds present  EXTREMITIES:  2+ Peripheral Pulses, No clubbing, cyanosis, or edema  PSYCH: AAOx3  NEUROLOGY: non-focal  SKIN: No rashes or lesions    Urinalysis Basic - ( 17 Oct 2022 16:33 )    Color: Light Yellow / Appearance: Clear / S.009 / pH: x  Gluc: x / Ketone: Small  / Bili: Negative / Urobili: Negative   Blood: x / Protein: Negative / Nitrite: Negative   Leuk Esterase: Negative / RBC: 17 /hpf / WBC 1 /HPF   Sq Epi: x / Non Sq Epi: 0 /hpf / Bacteria: Negative        RADIOLOGY & ADDITIONAL TESTS:    Imaging Personally Reviewed:    Consultant(s) Notes Reviewed:      Care Discussed with Consultants/Other Providers:

## 2022-10-19 NOTE — PROGRESS NOTE ADULT - ASSESSMENT
77 year old female with PMH of cholecystectomy in April 2022, parkinson's disease, hyperlipidemia, presenting with 1 week of general malaise, decreased appetite, nausea with dry heaving, subjective fever/chills, upper abdominal pain. Lab findings non-remarkable,   CT and MR imaging ruled out CBD residual cholelithiasis and obstruction.   Favoring post cholecystectomy syndrome, likely sphincter of oddi dysfunction vs biliary stasis/sludge vs small cholelithiasis in the CBD.   seen by GI, recs pending  # Epigastric and RUQ abdominal pain  # Nausea and decreased appetite        - 
77 year old female with PMH of cholecystectomy in April 2022, parkinson's disease, hyperlipidemia, presenting with 1 week of general malaise, decreased appetite, nausea with dry heaving, subjective fever/chills, upper abdominal pain. Lab findings non-remarkable,   CT imaging ruled out CBD residual cholelithiasis and obstruction. awaiting MRCP and GI consult  Possible post cholecystectomy syndrome, likely sphincter of oddi dysfunction vs biliary stasis/sludge vs small cholelithiasis in the CBD.     # Epigastric and RUQ abdominal pain  # Nausea and decreased appetite        -

## 2022-10-19 NOTE — DISCHARGE NOTE PROVIDER - HOSPITAL COURSE
77 year old female with PMH of cholecystectomy in April 2022, parkinson's disease, hyperlipidemia, presenting with 1 week of general malaise, decreased appetite, nausea with dry heaving, subjective fever/chills, upper abdominal pain. Abdominal pain has been on and off since the cholecystectomy but the past week it has been getting worse. Denies any other symptoms such as cough, sob, chest pain, lower extremity swelling.  	Additionally, she has a history of L knee replacement 13 years ago and has been noticing swelling and pain of L knee since last week.   Past month she presented to the ED with similar symptoms with unremarkable workup with CT abd/pelvis. Xray of abdomen performed which was negative for intraperitoneal free air.     RUQ US: mild intrahepatic biliary ductal dilation with dilated common bile duct measuring 11mm. likely related to cholecystectomy.   CT abdomen showing common bile duct dilatation.  MRCP performed which revealed mild biliary ductal dilation related to recent cholecystectomy. In addition to several subcentimeter side branch IPMNs of the Pancreas.     Xray performed of Left knee: no acute fracture/dislocation. Hardware appears intact with no periprosthetic lucencies to suggest loosening. no large knee joint effusion, limited assessment secondary to obliqued lateral. suggestion of prepatellar soft tissue swelling.      77 year old female with PMH of cholecystectomy in April 2022, parkinson's disease, hyperlipidemia, presenting with 1 week of general malaise, decreased appetite, nausea with dry heaving, subjective fever/chills, upper abdominal pain. Abdominal pain has been on and off since the cholecystectomy but the past week it has been getting worse. Denies any other symptoms such as cough, sob, chest pain, lower extremity swelling.  	Additionally, she has a history of L knee replacement 13 years ago and has been noticing swelling and pain of L knee since last week.   Past month she presented to the ED with similar symptoms with unremarkable workup with CT abd/pelvis. Xray of abdomen performed while in which  ED and was negative for intraperitoneal free air.     RUQ US: mild intrahepatic biliary ductal dilation with dilated common bile duct measuring 11mm. likely related to cholecystectomy.   CT abdomen showing common bile duct dilatation.  MRCP performed which revealed mild biliary ductal dilation related to recent cholecystectomy. In addition to several subcentimeter side branch IPMNs of the Pancreas.     Xray performed of Left knee: no acute fracture/dislocation. Hardware appears intact with no periprosthetic lucencies to suggest loosening. no large knee joint effusion, limited assessment secondary to obliqued lateral. suggestion of prepatellar soft tissue swelling.     GI consulted: recommended bowel regimen with Miralax, smaller/frequent meals, outpatient follow up with GI for pancreatic findings as well as outpatient gastric emptying study as needed.    Patient cleared for discharge home with outpatient follow up to her primary GI doctor. If her primary GI doctor is unable to perform GI emptying studies then patient will follow up with Dr. Wilkerson

## 2022-10-19 NOTE — DISCHARGE NOTE NURSING/CASE MANAGEMENT/SOCIAL WORK - PATIENT PORTAL LINK FT
You can access the FollowMyHealth Patient Portal offered by [facility] by registering at the following website: [email or web address]. By joining "[email or web address]’s FollowMyHealth portal, you will also be able to view your health information using other applications (apps) compatible with our system.

## 2022-11-08 PROCEDURE — 80053 COMPREHEN METABOLIC PANEL: CPT

## 2022-11-08 PROCEDURE — 74183 MRI ABD W/O CNTR FLWD CNTR: CPT

## 2022-11-08 PROCEDURE — 81001 URINALYSIS AUTO W/SCOPE: CPT

## 2022-11-08 PROCEDURE — 0225U NFCT DS DNA&RNA 21 SARSCOV2: CPT

## 2022-11-08 PROCEDURE — 76705 ECHO EXAM OF ABDOMEN: CPT

## 2022-11-08 PROCEDURE — 96375 TX/PRO/DX INJ NEW DRUG ADDON: CPT

## 2022-11-08 PROCEDURE — 87086 URINE CULTURE/COLONY COUNT: CPT

## 2022-11-08 PROCEDURE — 83690 ASSAY OF LIPASE: CPT

## 2022-11-08 PROCEDURE — 84484 ASSAY OF TROPONIN QUANT: CPT

## 2022-11-08 PROCEDURE — 96374 THER/PROPH/DIAG INJ IV PUSH: CPT

## 2022-11-08 PROCEDURE — 74177 CT ABD & PELVIS W/CONTRAST: CPT | Mod: MA

## 2022-11-08 PROCEDURE — 99285 EMERGENCY DEPT VISIT HI MDM: CPT | Mod: 25

## 2022-11-08 PROCEDURE — A9585: CPT

## 2022-11-08 PROCEDURE — 71045 X-RAY EXAM CHEST 1 VIEW: CPT

## 2022-11-08 PROCEDURE — 85025 COMPLETE CBC W/AUTO DIFF WBC: CPT

## 2022-11-08 PROCEDURE — 73564 X-RAY EXAM KNEE 4 OR MORE: CPT
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
## 2023-04-25 NOTE — ED ADULT TRIAGE NOTE - CHIEF COMPLAINT QUOTE
-- DO NOT REPLY / DO NOT REPLY ALL --  -- Message is from [facility] Center [facility] (ECO) --    Referral Request  Name of Specialist: Plastic surgery   Provider's specialty: Plastic Surgery    Medical condition for referral:  Skin removal     Is this a NEW request?: yes      Referral ordered by: Mom is calling      Insurance type:       Payor: [facility] MEDICAID / Plan: [facility] MEDICAID HMO / Product Type: T19 HMO      Preferred Delivery Method   Call when ready for pickup - phone number to notify: [phone]    Caller Information       Type Contact Phone/Fax    04/07/2023 10:46 AM CDT Phone (Incoming) MARQUEZ ERAZO (Mother) [phone]    04/25/2023 12:33 PM CDT Phone (Incoming)      04/25/2023 12:33 PM CDT Phone (Incoming) MARQUEZ ERAZO (Mother) [phone]          Alternative phone number: n/a    Can a detailed message be left? Yes    Please give this turnaround time to the caller:   \"This message will be sent to [state Provider's full name]. The clinical team will return your call as soon as they review your message. Typically, it takes 3 business days to process referral requests.\"  
-- DO NOT REPLY / DO NOT REPLY ALL --  -- Message is from [facility] ([facility]O) --    Request Result  Is the patient currently having Emergent symptoms?: No    Which result are you requesting?: TB Test and physical forms status     What is the full name of the provider that ordered the lab or test?: 4/3    Caller Information       Type Contact Phone/Fax    04/07/2023 10:46 AM CDT Phone (Incoming) MARQUEZ ERAZO (Mother) [phone]          Alternative phone number: N.a    Clinic site name / Account # for ordering provider: [facility]    Can a detailed message be left?: Yes    Message Turnaround:     [place]:    Please give this turnaround time to the caller:   \"This message will be sent to [state Provider's name]. The clinical team will fulfill your request as soon as they review your message.\"    Inform patients: \"Please be aware the return phone call may come from an unidentified or out of state phone number.\"  
Please see other message.   
Spoke with patient and scheduled both lab appointment and MCL.  Patient verbalized understanding and agreement.     
fever

## 2023-05-01 NOTE — DISCHARGE NOTE PROVIDER - NSRESEARCHGRANT_HIDDEN_GEN_A_CORE
Encounter addended by: Ernestina Calixto MA on: 5/1/2023 3:39 PM   Actions taken: Charge Capture section accepted Yes

## 2023-08-20 ENCOUNTER — EMERGENCY (EMERGENCY)
Facility: HOSPITAL | Age: 78
LOS: 1 days | Discharge: HOME | End: 2023-08-20
Attending: EMERGENCY MEDICINE
Payer: MEDICARE

## 2023-08-20 VITALS
SYSTOLIC BLOOD PRESSURE: 127 MMHG | RESPIRATION RATE: 16 BRPM | DIASTOLIC BLOOD PRESSURE: 78 MMHG | OXYGEN SATURATION: 97 % | HEART RATE: 74 BPM | TEMPERATURE: 98 F

## 2023-08-20 VITALS
HEIGHT: 63 IN | TEMPERATURE: 98 F | DIASTOLIC BLOOD PRESSURE: 80 MMHG | RESPIRATION RATE: 18 BRPM | HEART RATE: 79 BPM | WEIGHT: 115.96 LBS | SYSTOLIC BLOOD PRESSURE: 138 MMHG | OXYGEN SATURATION: 98 %

## 2023-08-20 DIAGNOSIS — Z90.710 ACQUIRED ABSENCE OF BOTH CERVIX AND UTERUS: Chronic | ICD-10-CM

## 2023-08-20 DIAGNOSIS — Z96.659 PRESENCE OF UNSPECIFIED ARTIFICIAL KNEE JOINT: Chronic | ICD-10-CM

## 2023-08-20 DIAGNOSIS — Z98.890 OTHER SPECIFIED POSTPROCEDURAL STATES: Chronic | ICD-10-CM

## 2023-08-20 LAB
ALBUMIN SERPL ELPH-MCNC: 4.2 G/DL — SIGNIFICANT CHANGE UP (ref 3.3–5)
ALP SERPL-CCNC: 50 U/L — SIGNIFICANT CHANGE UP (ref 40–120)
ALT FLD-CCNC: 15 U/L — SIGNIFICANT CHANGE UP (ref 10–45)
ANION GAP SERPL CALC-SCNC: 10 MMOL/L — SIGNIFICANT CHANGE UP (ref 5–17)
APPEARANCE UR: ABNORMAL
AST SERPL-CCNC: 22 U/L — SIGNIFICANT CHANGE UP (ref 10–40)
BACTERIA # UR AUTO: NEGATIVE — SIGNIFICANT CHANGE UP
BASOPHILS # BLD AUTO: 0.02 K/UL — SIGNIFICANT CHANGE UP (ref 0–0.2)
BASOPHILS NFR BLD AUTO: 0.3 % — SIGNIFICANT CHANGE UP (ref 0–2)
BILIRUB SERPL-MCNC: 0.8 MG/DL — SIGNIFICANT CHANGE UP (ref 0.2–1.2)
BILIRUB UR-MCNC: NEGATIVE — SIGNIFICANT CHANGE UP
BUN SERPL-MCNC: 12 MG/DL — SIGNIFICANT CHANGE UP (ref 7–23)
CALCIUM SERPL-MCNC: 9.5 MG/DL — SIGNIFICANT CHANGE UP (ref 8.4–10.5)
CHLORIDE SERPL-SCNC: 103 MMOL/L — SIGNIFICANT CHANGE UP (ref 96–108)
CO2 SERPL-SCNC: 28 MMOL/L — SIGNIFICANT CHANGE UP (ref 22–31)
COLOR SPEC: YELLOW — SIGNIFICANT CHANGE UP
CREAT SERPL-MCNC: 0.54 MG/DL — SIGNIFICANT CHANGE UP (ref 0.5–1.3)
DIFF PNL FLD: ABNORMAL
EGFR: 94 ML/MIN/1.73M2 — SIGNIFICANT CHANGE UP
EOSINOPHIL # BLD AUTO: 0.11 K/UL — SIGNIFICANT CHANGE UP (ref 0–0.5)
EOSINOPHIL NFR BLD AUTO: 1.7 % — SIGNIFICANT CHANGE UP (ref 0–6)
EPI CELLS # UR: 1 /HPF — SIGNIFICANT CHANGE UP
GLUCOSE SERPL-MCNC: 106 MG/DL — HIGH (ref 70–99)
GLUCOSE UR QL: NEGATIVE — SIGNIFICANT CHANGE UP
HCT VFR BLD CALC: 36.3 % — SIGNIFICANT CHANGE UP (ref 34.5–45)
HGB BLD-MCNC: 11.6 G/DL — SIGNIFICANT CHANGE UP (ref 11.5–15.5)
HYALINE CASTS # UR AUTO: 2 /LPF — SIGNIFICANT CHANGE UP (ref 0–2)
IMM GRANULOCYTES NFR BLD AUTO: 0.3 % — SIGNIFICANT CHANGE UP (ref 0–0.9)
KETONES UR-MCNC: NEGATIVE — SIGNIFICANT CHANGE UP
LEUKOCYTE ESTERASE UR-ACNC: ABNORMAL
LYMPHOCYTES # BLD AUTO: 0.58 K/UL — LOW (ref 1–3.3)
LYMPHOCYTES # BLD AUTO: 9.2 % — LOW (ref 13–44)
MCHC RBC-ENTMCNC: 30.5 PG — SIGNIFICANT CHANGE UP (ref 27–34)
MCHC RBC-ENTMCNC: 32 GM/DL — SIGNIFICANT CHANGE UP (ref 32–36)
MCV RBC AUTO: 95.5 FL — SIGNIFICANT CHANGE UP (ref 80–100)
MONOCYTES # BLD AUTO: 0.3 K/UL — SIGNIFICANT CHANGE UP (ref 0–0.9)
MONOCYTES NFR BLD AUTO: 4.8 % — SIGNIFICANT CHANGE UP (ref 2–14)
NEUTROPHILS # BLD AUTO: 5.27 K/UL — SIGNIFICANT CHANGE UP (ref 1.8–7.4)
NEUTROPHILS NFR BLD AUTO: 83.7 % — HIGH (ref 43–77)
NITRITE UR-MCNC: NEGATIVE — SIGNIFICANT CHANGE UP
NRBC # BLD: 0 /100 WBCS — SIGNIFICANT CHANGE UP (ref 0–0)
PH UR: 6.5 — SIGNIFICANT CHANGE UP (ref 5–8)
PLATELET # BLD AUTO: 154 K/UL — SIGNIFICANT CHANGE UP (ref 150–400)
POTASSIUM SERPL-MCNC: 4.1 MMOL/L — SIGNIFICANT CHANGE UP (ref 3.5–5.3)
POTASSIUM SERPL-SCNC: 4.1 MMOL/L — SIGNIFICANT CHANGE UP (ref 3.5–5.3)
PROT SERPL-MCNC: 6.8 G/DL — SIGNIFICANT CHANGE UP (ref 6–8.3)
PROT UR-MCNC: ABNORMAL
RAPID RVP RESULT: SIGNIFICANT CHANGE UP
RBC # BLD: 3.8 M/UL — SIGNIFICANT CHANGE UP (ref 3.8–5.2)
RBC # FLD: 13.7 % — SIGNIFICANT CHANGE UP (ref 10.3–14.5)
RBC CASTS # UR COMP ASSIST: 20 /HPF — HIGH (ref 0–4)
SARS-COV-2 RNA SPEC QL NAA+PROBE: SIGNIFICANT CHANGE UP
SODIUM SERPL-SCNC: 141 MMOL/L — SIGNIFICANT CHANGE UP (ref 135–145)
SP GR SPEC: 1.02 — SIGNIFICANT CHANGE UP (ref 1.01–1.02)
UROBILINOGEN FLD QL: NEGATIVE — SIGNIFICANT CHANGE UP
WBC # BLD: 6.3 K/UL — SIGNIFICANT CHANGE UP (ref 3.8–10.5)
WBC # FLD AUTO: 6.3 K/UL — SIGNIFICANT CHANGE UP (ref 3.8–10.5)
WBC UR QL: 3 /HPF — SIGNIFICANT CHANGE UP (ref 0–5)

## 2023-08-20 PROCEDURE — 71046 X-RAY EXAM CHEST 2 VIEWS: CPT

## 2023-08-20 PROCEDURE — 0225U NFCT DS DNA&RNA 21 SARSCOV2: CPT

## 2023-08-20 PROCEDURE — 71046 X-RAY EXAM CHEST 2 VIEWS: CPT | Mod: 26

## 2023-08-20 PROCEDURE — 99285 EMERGENCY DEPT VISIT HI MDM: CPT | Mod: 25

## 2023-08-20 PROCEDURE — 85025 COMPLETE CBC W/AUTO DIFF WBC: CPT

## 2023-08-20 PROCEDURE — 96374 THER/PROPH/DIAG INJ IV PUSH: CPT

## 2023-08-20 PROCEDURE — 87086 URINE CULTURE/COLONY COUNT: CPT

## 2023-08-20 PROCEDURE — 93005 ELECTROCARDIOGRAM TRACING: CPT

## 2023-08-20 PROCEDURE — 80053 COMPREHEN METABOLIC PANEL: CPT

## 2023-08-20 PROCEDURE — 81001 URINALYSIS AUTO W/SCOPE: CPT

## 2023-08-20 PROCEDURE — 99284 EMERGENCY DEPT VISIT MOD MDM: CPT

## 2023-08-20 RX ORDER — ACETAMINOPHEN 500 MG
1000 TABLET ORAL ONCE
Refills: 0 | Status: COMPLETED | OUTPATIENT
Start: 2023-08-20 | End: 2023-08-20

## 2023-08-20 RX ADMIN — Medication 400 MILLIGRAM(S): at 10:17

## 2023-08-20 NOTE — ED PROVIDER NOTE - PHYSICAL EXAMINATION
Const: Awake, alert, no acute distress.  Well appearing.  Moving comfortably on stretcher.  HEENT: NC/AT.  Moist mucous membranes.  No pharyngeal erythema, no exudates.  Eyes: Extraocular movements intact b/l.  Pupils equal, round, and reactive to light b/l.  Conjunctiva pink.  No scleral icterus.  Neck: Neck supple, full ROM without pain.  Cardiac: Regular rate and regular rhythm. S1 S2 present.  Peripheral pulses 2+ and symmetric.  Resp: Speaking in full sentences. No evidence of respiratory distress.  poor inspiratory effort, breath sounds clear to auscultation b/l.  Abd: Non-distended, no overlying skin changes.  Soft, non-tender, no guarding, no rigidity, no rebound tenderness.  No palpable masses.  Skin: Normal coloration.  No rashes, abrasions or lacerations.  Neuro: Awake, alert & oriented x 3.  Moves all extremities spontaneously.  No focal deficits. Const: Awake, alert, no acute distress.  Well appearing.  Moving comfortably on stretcher.  HEENT: NC/AT.  Moist mucous membranes.  No pharyngeal erythema, no exudates.  Eyes: Extraocular movements intact b/l.  Pupils equal, round, and reactive to light b/l.  Conjunctiva pink.  No scleral icterus.  Neck: Neck supple, full ROM without pain.  Cardiac: Regular rate and regular rhythm. S1 S2 present.  Peripheral pulses 2+ and symmetric.  Resp: Speaking in full sentences. No evidence of respiratory distress.  poor inspiratory effort, breath sounds clear to auscultation b/l.  Abd: Non-distended, no overlying skin changes.  Soft, non-tender, no guarding, no rigidity, no rebound tenderness.  No palpable masses.  Skin: Normal coloration.  No rashes, abrasions or lacerations.  Neuro: Awake, alert & oriented x 3.  Short-term and long-term memory intact (verified by daughter at the bedside).  Moves all extremities spontaneously.  No focal deficits.

## 2023-08-20 NOTE — ED ADULT NURSE NOTE - OBJECTIVE STATEMENT
78 year females brought in by her daughter  with PMH parkinson's disease, HLD, cholecystectomy, hysterectomy, bladder repair presenting into [facility] ED c/o 2 months of subjective fevers and chills.  Patient states for the past 2 months she has had episodes of chills and subjective fevers mostly in the morning, no recorded oral temperature, with intermittent generalized headaches, body aches, and intermittent epigastric abdominal pain.  Never took her temperature but feels warm insife as per daughter

## 2023-08-20 NOTE — ED PROVIDER NOTE - NSFOLLOWUPINSTRUCTIONS_ED_ALL_ED_FT
You were seen in the emergency department for evaluation of fevers, chills, and body aches, we checked bloods, urine, and a chest x-ray, please see results below.  Please review these results with your primary care physician to establish any follow ups as required.  Please follow up with your primary care physician and neurologist for further evaluation of your current symptoms.    Please RETURN TO THE EMERGENCY DEPARTMENT OR SEEK IMMEDIATE MEDICAL ATTENTION if you experience any new or worsening symptoms, including but not limited to: worsening headaches, worsening abdominal pain, nausea, vomiting, diarrhea, fainting, dizziness, inability to walk around, changes in vision, weakness in the arms or legs. You were seen in the emergency department for evaluation of fevers, chills, and body aches, we checked bloods, urine, and a chest x-ray, please see results below.  Please review these results with your primary care physician to establish any follow ups as required.  Please follow up with your primary care physician and neurologist for further evaluation of your current symptoms.  You can continue to take over the counter tylenol as needed for pain, please follow instructions on the bottle.    Please RETURN TO THE EMERGENCY DEPARTMENT OR SEEK IMMEDIATE MEDICAL ATTENTION if you experience any new or worsening symptoms, including but not limited to: worsening headaches, worsening abdominal pain, nausea, vomiting, diarrhea, fainting, dizziness, inability to walk around, changes in vision, weakness in the arms or legs.

## 2023-08-20 NOTE — ED PROVIDER NOTE - OBJECTIVE STATEMENT
77y/o F pt with PMHx parkinson's disease, HLD, cholecystectomy, hysterectomy, bladder repair presenting into [facility] ED c/o 2 months of subjective fevers and chills.  Patient states for the past 2 months she has had episodes of chills and subjective fevers mostly in the morning, no recorded oral temperature, with intermittent generalized headaches, body aches, and intermittent epigastric abdominal pain.  She also describes intermittent generalized central chest discomfort, episodes lasting 5 to 10 minutes, for the past 6 months.  She has been following with her PCP and neurologist, last saw neurologist 6 months ago.  No known sick contacts, no recent travel.  Denies nausea, vomiting, diarrhea, constipation, shortness of breath, vision changes, neck pain, back pain, loss of consciousness, coughing, vaginal bleeding, bloody stools, hematuria. 77y/o F pt with PMHx parkinson's disease, HLD, cholecystectomy, hysterectomy, bladder repair presenting into [facility] ED c/o 2 months of subjective fevers and chills.  Patient states for the past 2 months she has had episodes of chills and subjective fevers mostly in the morning, no recorded oral temperature, with intermittent generalized headaches, body aches, and intermittent epigastric abdominal pain.  She also describes intermittent generalized central chest discomfort, episodes lasting 5 to 10 minutes, for the past 6 months.  Last night at approx. 3am her headache and body aches became gradually worse, she took tylenol with no relief.  She has been following with her PCP and neurologist, last saw neurologist 6 months ago.  She is eating and drinking well at home.  No known sick contacts, no recent travel.  Denies nausea, vomiting, diarrhea, constipation, shortness of breath, vision changes, neck pain, back pain, loss of consciousness, coughing, vaginal bleeding, bloody stools, hematuria. 79y/o F pt with PMHx parkinson's disease, HLD, cholecystectomy, hysterectomy, bladder repair presenting into [facility] ED c/o 2 months of subjective fevers and chills.  Patient states for the past 2 months she has had episodes of chills and subjective fevers mostly in the morning, no recorded oral temperature, with intermittent generalized headaches, body aches, and intermittent epigastric abdominal pain.  She also describes intermittent generalized central chest discomfort, episodes lasting 5 to 10 minutes, for the past 6 months.  Last night at approx. 3am her headache and body aches became gradually worse, she took tylenol with no relief.  She has been following with her PCP and neurologist, last saw neurologist 6 months ago.  She is eating and drinking well at home.  No known sick contacts, no recent travel.  Denies nausea, vomiting, diarrhea, constipation, shortness of breath, vision changes, neck pain, back pain, loss of consciousness, coughing, vaginal bleeding, bloody stools, hematuria.    Pt's primary language is Malay, hx was obtained with the help of an official , ID# [id number].

## 2023-08-20 NOTE — ED PROVIDER NOTE - ATTENDING CONTRIBUTION TO CARE
Hx: h/o PArkinsons here with daughter with chills, body aches, headache, trouble sleeping.  Chills intermittent for 2 months, body aches also for a while, had trouble sleeping and felt uncomfortable this morning so came to ED.  No fever, cp, sob, no persistent ab pain.  +dysuria.    PE: Vitals reviewed, Head Atraumatic, PERRL, EOMI, moist mucous membranes, neck supple, no signs of meningitis, chest clear to ausculation, cardiac regular rate and rhythym, Abdomen soft and nontender, Extremities well perfused and without edema, Skin intact, Psych normal mood, Neuro nonfocal.     MDM: discomfort in elderly with reported chills, check cbc r/o anemia or leukocytosis, check bmp to r/o metabolic derangement and lyte imbalance, viral panel, ua r/o UTI.  Considered advanced imaging such as ct scans but not indicated due to exam benign.

## 2023-08-20 NOTE — ED ADULT NURSE REASSESSMENT NOTE - NS ED NURSE REASSESS COMMENT FT1
1133 Pain meds given as ordered and pt to x-ray Will continue to monitor MpuekRN 1133 Pain meds given as ordered and Food provided to pt  Will continue to monitor MpuekRN

## 2023-08-20 NOTE — ED ADULT TRIAGE NOTE - HEIGHT IN FEET
5 Propranolol Counseling:  I discussed with the patient the risks of propranolol including but not limited to low heart rate, low blood pressure, low blood sugar, restlessness and increased cold sensitivity. They should call the office if they experience any of these side effects.

## 2023-08-20 NOTE — ED PROVIDER NOTE - PATIENT PORTAL LINK FT
You can access the FollowMyHealth Patient Portal offered by [facility] by registering at the following website: [email or web address]. By joining [employer]’s FollowMyHealth portal, you will also be able to view your health information using other applications (apps) compatible with our system.

## 2023-08-20 NOTE — ED ADULT NURSE NOTE - NSFALLUNIVINTERV_ED_ALL_ED
Bed/Stretcher in lowest position, wheels locked, appropriate side rails in place/Call bell, personal items and telephone in reach/Instruct patient to call for assistance before getting out of bed/chair/stretcher/Non-slip footwear applied when patient is off stretcher/[place] to call system/Physically safe environment - no spills, clutter or unnecessary equipment/Purposeful proactive rounding/Room/bathroom lighting operational, light cord in reach

## 2023-08-20 NOTE — ED PROVIDER NOTE - CLINICAL SUMMARY MEDICAL DECISION MAKING FREE TEXT BOX
Dr. Abarca Note: see attg statement below This is a 77y/o woman with a hx of parkinson's presenting with approx. 2mo of subjective fevers and chills and generalized body aches, acutely worse last night, normal examination, will check CXR and urine and RVP for possible infection, CBC and CMP for possible infection, anemia, or electrolyte abnormalities, plan to follow up results and reassess, likely discharge home with outpatient follow up with PCP and neurology.    Dr. Abarca Note: see attg statement below

## 2023-08-20 NOTE — ED PROVIDER NOTE - PROGRESS NOTE DETAILS
Bj LOOMIS), PGY-1: blood results showing no actionable results, CXR with no acute findings, urine showing hematuria but no bacteria, pt reports having hematuria at baseline, pt reports continued headache and body aches, IV tylenol currently going in now, discussed results with pt and plan for outpatient follow up for further evaluation of symptoms, pt in agreement with plan, wants to finish tylenol and then is comfortable for discharge home. Bj LOOMIS), PGY-1: tylenol finished running, pt able to walk and go to the bathroom, tolerating PO in the ED, still comfortable with plan to DC home with outpatient follow up, will discharge pt home.

## 2023-08-21 LAB
CULTURE RESULTS: SIGNIFICANT CHANGE UP
SPECIMEN SOURCE: SIGNIFICANT CHANGE UP

## 2024-02-05 NOTE — ED ADULT NURSE NOTE - NSIMPLEMENTINTERV_GEN_ALL_ED
Condition:: Mohs follow up.
Please Describe Your Condition:: is an established patient who is being seen for a chief complaint of Mohs follow up.. Patient has Mohs at [facility] with Dr. Taveras on left inferior lateral malar cheek for Basal Cell Carcinoma on 12-27-23.
Implemented All Fall Risk Interventions:  [place] to call system. Call bell, personal items and telephone within reach. Instruct patient to call for assistance. Room bathroom lighting operational. Non-slip footwear when patient is off stretcher. Physically safe environment: no spills, clutter or unnecessary equipment. Stretcher in lowest position, wheels locked, appropriate side rails in place. Provide visual cue, wrist band, yellow gown, etc. Monitor gait and stability. Monitor for mental status changes and reorient to person, place, and time. Review medications for side effects contributing to fall risk. Reinforce activity limits and safety measures with patient and family.

## 2024-03-27 NOTE — ED PROVIDER NOTE - CONDITION AT DISCHARGE:
Comment: Donor site for: \\nNasal Supratip, S/P [facility] (02/21/24) Render Risk Assessment In Note?: no Detail Level: Simple Satisfactory

## 2024-12-09 NOTE — PATIENT PROFILE ADULT - PRO INTERPRETER NEED 2
Refill request received from [facility]    for   Requested Prescriptions     Pending Prescriptions Disp Refills    TOUJEO SOLOSTAR 300 UNIT/ML concentrated injection pen [Pharmacy Med Name: TOUJEO SOLOSTAR 300U/ML PEN 1.5ML] 7.5 mL 5     Sig: ADMINISTER 25 TO 30 UNITS UNDER THE SKIN AT BEDTIME     Last office visit: 9/17/2024   Next office visit: Visit date not found     Routed to PCP  for review.     Emilie Vitale LPN   [place]

## 2025-07-02 NOTE — H&P PST ADULT - NSANTHTIREDRD_ENT_A_CORE
[General Appearance - Well Developed] : well developed [Normal Appearance] : normal appearance [Well Groomed] : well groomed [General Appearance - Well Nourished] : well nourished [No Deformities] : no deformities [General Appearance - In No Acute Distress] : no acute distress [Normal Conjunctiva] : the conjunctiva exhibited no abnormalities [Eyelids - No Xanthelasma] : the eyelids demonstrated no xanthelasmas [Normal Oral Mucosa] : normal oral mucosa [No Oral Pallor] : no oral pallor [No Oral Cyanosis] : no oral cyanosis [Normal Jugular Venous A Waves Present] : normal jugular venous A waves present [Normal Jugular Venous V Waves Present] : normal jugular venous V waves present [No Jugular Venous Reyes A Waves] : no jugular venous reyes A waves [Respiration, Rhythm And Depth] : normal respiratory rhythm and effort [Exaggerated Use Of Accessory Muscles For Inspiration] : no accessory muscle use [Auscultation Breath Sounds / Voice Sounds] : lungs were clear to auscultation bilaterally [Abdomen Soft] : soft [Abdomen Tenderness] : non-tender [Abdomen Mass (___ Cm)] : no abdominal mass palpated [Abnormal Walk] : normal gait [Gait - Sufficient For Exercise Testing] : the gait was sufficient for exercise testing [Nail Clubbing] : no clubbing of the fingernails [Cyanosis, Localized] : no localized cyanosis [Petechial Hemorrhages (___cm)] : no petechial hemorrhages [Skin Color & Pigmentation] : normal skin color and pigmentation [] : no rash [No Venous Stasis] : no venous stasis [Skin Lesions] : no skin lesions [No Skin Ulcers] : no skin ulcer [No Xanthoma] : no  xanthoma was observed [Oriented To Time, Place, And Person] : oriented to person, place, and time [Affect] : the affect was normal [Mood] : the mood was normal [No Anxiety] : not feeling anxious No